# Patient Record
Sex: FEMALE | Race: WHITE | Employment: STUDENT | URBAN - METROPOLITAN AREA
[De-identification: names, ages, dates, MRNs, and addresses within clinical notes are randomized per-mention and may not be internally consistent; named-entity substitution may affect disease eponyms.]

---

## 2018-07-09 LAB
GRBS, EXTERNAL: NORMAL
HBSAG, EXTERNAL: NEGATIVE
HEPATITIS C AB,   EXT: NORMAL
HIV, EXTERNAL: NORMAL
RPR, EXTERNAL: NORMAL
RUBELLA, EXTERNAL: NORMAL

## 2018-12-13 ENCOUNTER — HOSPITAL ENCOUNTER (OUTPATIENT)
Dept: LAB | Age: 23
Discharge: HOME | End: 2018-12-13
Attending: OBSTETRICS & GYNECOLOGY
Payer: COMMERCIAL

## 2018-12-13 LAB — FIBRONECTIN FETAL VAG QL: NEGATIVE

## 2018-12-13 PROCEDURE — 82731 ASSAY OF FETAL FIBRONECTIN: CPT

## 2019-02-11 ENCOUNTER — ANESTHESIA EVENT (OUTPATIENT)
Dept: LABOR AND DELIVERY | Age: 24
End: 2019-02-11
Payer: COMMERCIAL

## 2019-02-11 ENCOUNTER — HOSPITAL ENCOUNTER (INPATIENT)
Age: 24
LOS: 2 days | Discharge: HOME | End: 2019-02-13
Attending: OBSTETRICS & GYNECOLOGY | Admitting: OBSTETRICS & GYNECOLOGY
Payer: COMMERCIAL

## 2019-02-11 ENCOUNTER — ANESTHESIA (OUTPATIENT)
Dept: LABOR AND DELIVERY | Age: 24
End: 2019-02-11
Payer: COMMERCIAL

## 2019-02-11 PROBLEM — O26.86 PUPP (PRURITIC URTICARIAL PAPULES AND PLAQUES OF PREGNANCY): Status: ACTIVE | Noted: 2019-02-11

## 2019-02-11 PROBLEM — Z34.90 ENCOUNTER FOR PLANNED INDUCTION OF LABOR: Status: ACTIVE | Noted: 2019-02-11

## 2019-02-11 PROBLEM — Z3A.39 39 WEEKS GESTATION OF PREGNANCY: Status: ACTIVE | Noted: 2019-02-11

## 2019-02-11 LAB
ABO + RH BLD: NORMAL
ARTERIAL PATENCY WRIST A: ABNORMAL
ARTERIAL PATENCY WRIST A: ABNORMAL
BASE DEFICIT BLD-SCNC: 4 MMOL/L
BASE DEFICIT BLD-SCNC: 6 MMOL/L
BDY SITE: ABNORMAL
BDY SITE: ABNORMAL
BLOOD GROUP ANTIBODIES SERPL: NORMAL
BODY TEMPERATURE: 98.6
BODY TEMPERATURE: 98.6
CO2 BLD-SCNC: 23 MMOL/L
CO2 BLD-SCNC: 24 MMOL/L
COLLECT TIME,HTIME: 143
COLLECT TIME,HTIME: 1430
ERYTHROCYTE [DISTWIDTH] IN BLOOD BY AUTOMATED COUNT: 13.4 % (ref 11.9–14.6)
GAS FLOW.O2 O2 DELIVERY SYS: ABNORMAL L/MIN
GAS FLOW.O2 O2 DELIVERY SYS: ABNORMAL L/MIN
HCO3 BLD-SCNC: 22.2 MMOL/L (ref 22–26)
HCO3 BLDV-SCNC: 21.4 MMOL/L (ref 23–28)
HCT VFR BLD AUTO: 34.1 % (ref 35.8–46.3)
HGB BLD-MCNC: 11 G/DL (ref 11.7–15.4)
MCH RBC QN AUTO: 27.1 PG (ref 26.1–32.9)
MCHC RBC AUTO-ENTMCNC: 32.3 G/DL (ref 31.4–35)
MCV RBC AUTO: 84 FL (ref 79.6–97.8)
NRBC # BLD: 0 K/UL (ref 0–0.2)
PCO2 BLDCO: 41 MMHG (ref 32–68)
PCO2 BLDCO: 52 MMHG (ref 32–68)
PH BLDCO: 7.24 [PH] (ref 7.15–7.38)
PH BLDCO: 7.33 [PH] (ref 7.15–7.38)
PLATELET # BLD AUTO: 242 K/UL (ref 150–450)
PMV BLD AUTO: 11 FL (ref 9.4–12.3)
PO2 BLDCO: 23 MMHG
PO2 BLDCO: 27 MMHG
RBC # BLD AUTO: 4.06 M/UL (ref 4.05–5.2)
SAO2 % BLD: 31 % (ref 95–98)
SAO2 % BLDV: 46 % (ref 65–88)
SERVICE CMNT-IMP: ABNORMAL
SERVICE CMNT-IMP: ABNORMAL
SPECIMEN EXP DATE BLD: NORMAL
SPECIMEN TYPE: ABNORMAL
SPECIMEN TYPE: ABNORMAL
WBC # BLD AUTO: 9.2 K/UL (ref 4.3–11.1)

## 2019-02-11 PROCEDURE — 77030003028 HC SUT VCRL J&J -A

## 2019-02-11 PROCEDURE — 75410000003 HC RECOV DEL/VAG/CSECN EA 0.5 HR

## 2019-02-11 PROCEDURE — 77030011943

## 2019-02-11 PROCEDURE — 4A1HXCZ MONITORING OF PRODUCTS OF CONCEPTION, CARDIAC RATE, EXTERNAL APPROACH: ICD-10-PCS | Performed by: OBSTETRICS & GYNECOLOGY

## 2019-02-11 PROCEDURE — A4300 CATH IMPL VASC ACCESS PORTAL: HCPCS | Performed by: ANESTHESIOLOGY

## 2019-02-11 PROCEDURE — 74011250636 HC RX REV CODE- 250/636: Performed by: OBSTETRICS & GYNECOLOGY

## 2019-02-11 PROCEDURE — 85027 COMPLETE CBC AUTOMATED: CPT

## 2019-02-11 PROCEDURE — 82803 BLOOD GASES ANY COMBINATION: CPT

## 2019-02-11 PROCEDURE — 74011250637 HC RX REV CODE- 250/637: Performed by: OBSTETRICS & GYNECOLOGY

## 2019-02-11 PROCEDURE — 75410000002 HC LABOR FEE PER 1 HR

## 2019-02-11 PROCEDURE — 74011250636 HC RX REV CODE- 250/636

## 2019-02-11 PROCEDURE — 74011000250 HC RX REV CODE- 250

## 2019-02-11 PROCEDURE — 76060000078 HC EPIDURAL ANESTHESIA

## 2019-02-11 PROCEDURE — 77030005518 HC CATH URETH FOL 2W BARD -B

## 2019-02-11 PROCEDURE — 77030018846 HC SOL IRR STRL H20 ICUM -A

## 2019-02-11 PROCEDURE — 65270000029 HC RM PRIVATE

## 2019-02-11 PROCEDURE — 74011000258 HC RX REV CODE- 258: Performed by: OBSTETRICS & GYNECOLOGY

## 2019-02-11 PROCEDURE — 77030014125 HC TY EPDRL BBMI -B: Performed by: ANESTHESIOLOGY

## 2019-02-11 PROCEDURE — 10907ZC DRAINAGE OF AMNIOTIC FLUID, THERAPEUTIC FROM PRODUCTS OF CONCEPTION, VIA NATURAL OR ARTIFICIAL OPENING: ICD-10-PCS | Performed by: OBSTETRICS & GYNECOLOGY

## 2019-02-11 PROCEDURE — 0UQMXZZ REPAIR VULVA, EXTERNAL APPROACH: ICD-10-PCS | Performed by: OBSTETRICS & GYNECOLOGY

## 2019-02-11 PROCEDURE — 75410000000 HC DELIVERY VAGINAL/SINGLE

## 2019-02-11 PROCEDURE — 3E033VJ INTRODUCTION OF OTHER HORMONE INTO PERIPHERAL VEIN, PERCUTANEOUS APPROACH: ICD-10-PCS | Performed by: OBSTETRICS & GYNECOLOGY

## 2019-02-11 PROCEDURE — 86900 BLOOD TYPING SEROLOGIC ABO: CPT

## 2019-02-11 RX ORDER — OXYTOCIN/RINGER'S LACTATE 30/500 ML
0-25 PLASTIC BAG, INJECTION (ML) INTRAVENOUS
Status: DISCONTINUED | OUTPATIENT
Start: 2019-02-11 | End: 2019-02-11 | Stop reason: HOSPADM

## 2019-02-11 RX ORDER — ROPIVACAINE HYDROCHLORIDE 2 MG/ML
INJECTION, SOLUTION EPIDURAL; INFILTRATION; PERINEURAL AS NEEDED
Status: DISCONTINUED | OUTPATIENT
Start: 2019-02-11 | End: 2019-02-12 | Stop reason: HOSPADM

## 2019-02-11 RX ORDER — LIDOCAINE HYDROCHLORIDE AND EPINEPHRINE 15; 5 MG/ML; UG/ML
INJECTION, SOLUTION EPIDURAL AS NEEDED
Status: DISCONTINUED | OUTPATIENT
Start: 2019-02-11 | End: 2019-02-12 | Stop reason: HOSPADM

## 2019-02-11 RX ORDER — ONDANSETRON 4 MG/1
4 TABLET, ORALLY DISINTEGRATING ORAL
Status: ACTIVE | OUTPATIENT
Start: 2019-02-11 | End: 2019-02-12

## 2019-02-11 RX ORDER — OXYTOCIN/RINGER'S LACTATE 30/500 ML
1-25 PLASTIC BAG, INJECTION (ML) INTRAVENOUS
Status: DISCONTINUED | OUTPATIENT
Start: 2019-02-11 | End: 2019-02-11

## 2019-02-11 RX ORDER — OXYTOCIN/RINGER'S LACTATE 30/500 ML
PLASTIC BAG, INJECTION (ML) INTRAVENOUS
Status: COMPLETED
Start: 2019-02-11 | End: 2019-02-11

## 2019-02-11 RX ORDER — FAMOTIDINE 20 MG/1
20 TABLET, FILM COATED ORAL
Status: COMPLETED | OUTPATIENT
Start: 2019-02-11 | End: 2019-02-11

## 2019-02-11 RX ORDER — SODIUM CHLORIDE 0.9 % (FLUSH) 0.9 %
5-40 SYRINGE (ML) INJECTION EVERY 8 HOURS
Status: DISCONTINUED | OUTPATIENT
Start: 2019-02-11 | End: 2019-02-11

## 2019-02-11 RX ORDER — DIPHENHYDRAMINE HCL 25 MG
25 CAPSULE ORAL
Status: DISCONTINUED | OUTPATIENT
Start: 2019-02-11 | End: 2019-02-13 | Stop reason: HOSPADM

## 2019-02-11 RX ORDER — LIDOCAINE HYDROCHLORIDE 10 MG/ML
1 INJECTION INFILTRATION; PERINEURAL
Status: DISCONTINUED | OUTPATIENT
Start: 2019-02-11 | End: 2019-02-11 | Stop reason: HOSPADM

## 2019-02-11 RX ORDER — BUTORPHANOL TARTRATE 2 MG/ML
1 INJECTION INTRAMUSCULAR; INTRAVENOUS
Status: DISCONTINUED | OUTPATIENT
Start: 2019-02-11 | End: 2019-02-11 | Stop reason: HOSPADM

## 2019-02-11 RX ORDER — OXYTOCIN/RINGER'S LACTATE 15/250 ML
250 PLASTIC BAG, INJECTION (ML) INTRAVENOUS ONCE
Status: DISCONTINUED | OUTPATIENT
Start: 2019-02-11 | End: 2019-02-11

## 2019-02-11 RX ORDER — HYDROCODONE BITARTRATE AND ACETAMINOPHEN 5; 325 MG/1; MG/1
1 TABLET ORAL
Status: DISCONTINUED | OUTPATIENT
Start: 2019-02-11 | End: 2019-02-13 | Stop reason: HOSPADM

## 2019-02-11 RX ORDER — DEXTROSE, SODIUM CHLORIDE, SODIUM LACTATE, POTASSIUM CHLORIDE, AND CALCIUM CHLORIDE 5; .6; .31; .03; .02 G/100ML; G/100ML; G/100ML; G/100ML; G/100ML
125 INJECTION, SOLUTION INTRAVENOUS CONTINUOUS
Status: DISCONTINUED | OUTPATIENT
Start: 2019-02-11 | End: 2019-02-11

## 2019-02-11 RX ORDER — IBUPROFEN 800 MG/1
800 TABLET ORAL
Status: DISCONTINUED | OUTPATIENT
Start: 2019-02-11 | End: 2019-02-13 | Stop reason: HOSPADM

## 2019-02-11 RX ORDER — SIMETHICONE 80 MG
80 TABLET,CHEWABLE ORAL
Status: DISCONTINUED | OUTPATIENT
Start: 2019-02-11 | End: 2019-02-13 | Stop reason: HOSPADM

## 2019-02-11 RX ORDER — HYDROMORPHONE HYDROCHLORIDE 2 MG/ML
1 INJECTION, SOLUTION INTRAMUSCULAR; INTRAVENOUS; SUBCUTANEOUS
Status: DISCONTINUED | OUTPATIENT
Start: 2019-02-11 | End: 2019-02-13 | Stop reason: HOSPADM

## 2019-02-11 RX ORDER — SODIUM CHLORIDE 0.9 % (FLUSH) 0.9 %
5-40 SYRINGE (ML) INJECTION AS NEEDED
Status: DISCONTINUED | OUTPATIENT
Start: 2019-02-11 | End: 2019-02-11

## 2019-02-11 RX ORDER — NALOXONE HYDROCHLORIDE 0.4 MG/ML
0.4 INJECTION, SOLUTION INTRAMUSCULAR; INTRAVENOUS; SUBCUTANEOUS AS NEEDED
Status: DISCONTINUED | OUTPATIENT
Start: 2019-02-11 | End: 2019-02-13 | Stop reason: HOSPADM

## 2019-02-11 RX ORDER — LIDOCAINE HYDROCHLORIDE 20 MG/ML
JELLY TOPICAL
Status: DISCONTINUED | OUTPATIENT
Start: 2019-02-11 | End: 2019-02-11 | Stop reason: HOSPADM

## 2019-02-11 RX ORDER — ROPIVACAINE HYDROCHLORIDE 2 MG/ML
INJECTION, SOLUTION EPIDURAL; INFILTRATION; PERINEURAL
Status: DISCONTINUED | OUTPATIENT
Start: 2019-02-11 | End: 2019-02-12 | Stop reason: HOSPADM

## 2019-02-11 RX ORDER — MINERAL OIL
120 OIL (ML) ORAL
Status: COMPLETED | OUTPATIENT
Start: 2019-02-11 | End: 2019-02-11

## 2019-02-11 RX ADMIN — MINERAL OIL 120 ML: 471.95 OIL ORAL at 13:50

## 2019-02-11 RX ADMIN — Medication 1 SPRAY: at 19:21

## 2019-02-11 RX ADMIN — WITCH HAZEL 1 PAD: 500 SOLUTION RECTAL; TOPICAL at 19:21

## 2019-02-11 RX ADMIN — ROPIVACAINE HYDROCHLORIDE 10 ML: 2 INJECTION, SOLUTION EPIDURAL; INFILTRATION; PERINEURAL at 10:38

## 2019-02-11 RX ADMIN — SODIUM CHLORIDE, SODIUM LACTATE, POTASSIUM CHLORIDE, CALCIUM CHLORIDE, AND DEXTROSE MONOHYDRATE 125 ML/HR: 600; 310; 30; 20; 5 INJECTION, SOLUTION INTRAVENOUS at 07:25

## 2019-02-11 RX ADMIN — Medication 2 MILLI-UNITS/MIN: at 07:39

## 2019-02-11 RX ADMIN — PENICILLIN G POTASSIUM 2.5 MILLION UNITS: 20000000 POWDER, FOR SOLUTION INTRAVENOUS at 11:40

## 2019-02-11 RX ADMIN — HYDROCODONE BITARTRATE AND ACETAMINOPHEN 1 TABLET: 5; 325 TABLET ORAL at 22:04

## 2019-02-11 RX ADMIN — SODIUM CHLORIDE 5 MILLION UNITS: 900 INJECTION, SOLUTION INTRAVENOUS at 07:43

## 2019-02-11 RX ADMIN — ROPIVACAINE HYDROCHLORIDE 10 ML/HR: 2 INJECTION, SOLUTION EPIDURAL; INFILTRATION; PERINEURAL at 10:38

## 2019-02-11 RX ADMIN — IBUPROFEN 800 MG: 800 TABLET, FILM COATED ORAL at 19:43

## 2019-02-11 RX ADMIN — LIDOCAINE HYDROCHLORIDE AND EPINEPHRINE 5 ML: 15; 5 INJECTION, SOLUTION EPIDURAL at 10:35

## 2019-02-11 RX ADMIN — FAMOTIDINE 20 MG: 20 TABLET ORAL at 09:51

## 2019-02-11 NOTE — H&P
History & Physical 
 
Name: Brooke Singleton MRN: [id number]  SSN: [id number] YOB: 1995  Age: 21 y.o. Sex: female Subjective:  
 
Estimated Date of Delivery: 19 OB History  Para Term  AB Living 1 SAB TAB Ectopic Molar Multiple Live Births # Outcome Date GA Lbr Sundeep/2nd Weight Sex Delivery Anes PTL Lv  
1 Current Ms. Lady Conner is admitted with pregnancy at 39w0d for induction of labor due to favorable cervix at term, maternal discomfort due to PUPPS- symptoms have resolved. Prenatal course was complicated by GBS in urine. Please see prenatal records for details. Past Medical History:  
Diagnosis Date  Primary oligomenorrhea 2013  PUPP (pruritic urticarial papules and plaques of pregnancy)  Past Surgical History:  
Procedure Laterality Date  HX OTHER SURGICAL    
 frenulum release  HX OTHER SURGICAL    
 arthroscopic knee Social History Occupational History  Not on file Tobacco Use  Smoking status: Never Smoker  Smokeless tobacco: Never Used Substance and Sexual Activity  Alcohol use: No  
  Frequency: Never  Drug use: No  
 Sexual activity: Yes  
  Partners: Male History reviewed. No pertinent family history. No Known Allergies Prior to Admission medications Medication Sig Start Date End Date Taking? Authorizing Provider  
prenatal vit-iron fumarate-fa 27 mg iron- 0.8 mg tab tablet Take 1 Tab by mouth daily. Yes Provider, Historical  
progesterone (PROMETRIUM) 100 mg capsule 1 po q hs prn if no period for 8-12 weeks 13   Makayla Ibarra MD  
  
 
Review of Systems: A comprehensive review of systems was negative except for that written in the History of Present Illness. Objective:  
 
Vitals: 
Vitals:  
 19 0730 19 0745 19 0751 19 0800 BP: (!) 132/94 126/90  119/85 Pulse: 96 94  93 Resp: 20     
 Temp: 98.4 °F (36.9 °C) Weight:   69.9 kg (154 lb) Height:   5' 5\" (1.651 m) Physical Exam: 
Patient without distress. Heart: Regular rate and rhythm, S1S2 present or without murmur or extra heart sounds Lung: clear to auscultation throughout lung fields, no wheezes, no rales, no rhonchi and normal respiratory effort Abdomen: soft, nontender Fundus: soft and non tender Perineum: blood absent, amniotic fluid absent Cervical Exam: 3.5/80/-1, blood with exam, AROM clear fluid Lower Extremities:  - Edema No 
Membranes:  Artificial Rupture of Membranes; Amniotic Fluid: medium amount of clear fluid Fetal Heart Rate: Reactive Baseline: 130 per minute Variability: moderate Accelerations: yes Decelerations: none Uterine contractions: irregular, every 3-5 minutes Prenatal Labs:  
Lab Results Component Value Date/Time  
 Rubella, External Immune 07/09/2018 HBsAg, External Negative 07/09/2018 HIV, External Non-Reactive 07/09/2018 RPR, External Non-Reactive 07/09/2018 GrBStrep, External Positive urine 07/09/2018 Impression/Plan: Active Problems: PUPP (pruritic urticarial papules and plaques of pregnancy) (2/11/2019) 39 weeks gestation of pregnancy (2/11/2019) Encounter for planned induction of labor (2/11/2019) Plan: Admit for induction of labor. Group B Strep positive, will treat prophylactically with penicillin. S/p AROM. On pitocin 4 mU. Continue to titrate as needed. Signed By:  Ayla Santiago MD   
 February 11, 2019

## 2019-02-11 NOTE — PROGRESS NOTES
Labor Note S: comfortable with ISAI 
 
O:  
Visit Vitals /70 Pulse (!) 109 Temp 98.3 °F (36.8 °C) Resp 20 Ht 5' 5\" (1.651 m) Wt 69.9 kg (154 lb) Breastfeeding? No  
BMI 25.63 kg/m² Gen- NAD 
SVE- 5-6/90/0 FHT - baseline 130, mod variability, + accels , no decels [place]- ctx q 2-3 A/P: 21 y.o. G1 @ 44 0/7 
1) Term IUP- Cat 1 
2) Labor- s/p AROM. On pitocin. Comfortable with ISAI. 3) GBS pos- getting PCN.

## 2019-02-11 NOTE — ANESTHESIA PREPROCEDURE EVALUATION
Anesthetic History No history of anesthetic complications Review of Systems / Medical History Patient summary reviewed and pertinent labs reviewed Pulmonary Within defined limits Neuro/Psych Within defined limits Cardiovascular Within defined limits Exercise tolerance: >4 METS 
  
GI/Hepatic/Renal 
Within defined limits Endo/Other Within defined limits Other Findings Physical Exam 
 
Airway Mallampati: II 
TM Distance: 4 - 6 cm Neck ROM: normal range of motion Mouth opening: Normal 
 
 Cardiovascular Rhythm: regular Rate: normal 
 
 
 
 Dental 
No notable dental hx Pulmonary Breath sounds clear to auscultation Abdominal 
GI exam deferred Other Findings Anesthetic Plan ASA: 2 Anesthesia type: epidural 
 
 
 
 
Induction: Intravenous Anesthetic plan and risks discussed with: Patient

## 2019-02-11 NOTE — L&D DELIVERY NOTE
Delivery Summary    Patient: Cortney Agrawal MRN: [id number]  SSN: [id number]    YOB: 1995  Age: 21 y.o. Sex: female         Obstetrician:  Gita Posey MD    Assistant: none    Pre-Delivery Diagnosis: Term pregnancy, Induced labor, Single fetus and Pregnancy complicated by: GBS in urine, PUPPS    Post-Delivery Diagnosis: Living  infant(s) and Female    Intrapartum Event: None    Procedure: Spontaneous vaginal delivery    Epidural: YES    Information for the patient's :  Mallory Aguirre [[id number]]       Labor Events:    Labor: No   Rupture Date: 2019   Rupture Time: 8:21 AM   Rupture Type AROM   Amniotic Fluid Volume: Moderate    Amniotic Fluid Description: Clear None   Induction: AROM       Augmentation: Oxytocin   Labor Events: None     Cervical Ripening:     None     Delivery Events:  Episiotomy: None   Laceration(s): Vaginal;Left periurethral     Repaired: Yes    Number of Repair Packets: 2   Suture Type and Size: Vicryl 3-0     Estimated Blood Loss (ml): 200ml       Delivery Date: 2019    Delivery Time: 2:30 PM  Delivery Type: Vaginal, Spontaneous  Sex:  Female     Gestational Age: 39w0d   Delivery Clinician:  Gita Posey  Living Status: Living   Delivery Location: L&D 434          APGARS  One minute Five minutes Ten minutes   Skin color: 1   1        Heart rate: 2   2        Grimace: 2   2        Muscle tone: 2   2        Breathin   2        Totals: 9   9            Presentation: Vertex    Position: Right Occiput Anterior  Resuscitation Method:  Suctioning-bulb; Tactile Stimulation     Meconium Stained:        Cord Information: 3 Vessels  Complications: None  Cord around:    Delayed cord clamping?  Yes  Cord clamped date/time:   Disposition of Cord Blood: Lab    Blood Gases Sent?: Yes    Placenta:  Date/Time: 2019  2:36 PM  Removal: Spontaneous      Appearance: Normal     [place] Measurements:  Birth Weight: 3.29 kg      Birth Length: 48.5 cm Head Circumference: 35 cm      Chest Circumference: 34 cm     Abdominal Girth: Other Providers:   CHRIS Tolliver;Russell PRINCE, Obstetrician;Primary Nurse;Primary  Nurse;Scrub Tech;Charge Nurse            Recent Labs     19  1447 19  1446   PCO2CB 41 52   PO2CB 27 23   HCO3I  --  22.2   SO2I  --  31*   IBD 4 6   PTEMPI 98.6 98.6   SPECTI VENOUS CORD ARTERIAL CORD   PHICB 7.327 7.238   ISITE CORD CORD   IDEV ROOM AIR ROOM AIR   IALLEN NOT APPLICABLE NOT APPLICABLE         Cord Blood Results:   Information for the patient's :  Vaughn Funk [[id number]]     Lab Results   Component Value Date/Time    JOSE IgG NEG 2019 02:30 PM    ABO/Rh(D) [place] POSITIVE 2019 02:30 PM     Prenatal Labs:     Lab Results   Component Value Date/Time    ABO/Rh(D) [place] POSITIVE 2019 07:23 AM    HBsAg, External Negative 2018    HIV, External Non-Reactive 2018    Rubella, External Immune 2018    RPR, External Non-Reactive 2018    GrBStrep, External Positive urine 2018      Group B Strep:   Lab Results   Component Value Date/Time    GrBStrep, External Positive urine 2018     Information for the patient's :  Vaughn Funk [[id number]]     Lab Results   Component Value Date/Time    ABO/Rh(D) [place] Shake POSITIVE 2019 02:30 PM    JOSE IgG NEG 2019 02:30 PM            Attending Attestation:   Shandra Simeon delivered in [address] [place] [address] [facility]. Shoulders and body easily delivered. Mouth and nose bulb suctioned. Infant placed on maternal abdomen. Cord gases and blood obtained after 2 minute delay. Cord clamped x 2 and cut. Placenta delivered intact using fundal massage and gentle traction, 3V cord. Perineum inspected and intact, bilateral sulcal tears 3 cm in length that goes superficially over the labia. Repaired with 3-0 vicryl in the usual fashion.  Left periurethral laceration repaired in the usual fashion. EBL: 200 cc  Fundus firm. Mom and baby stable. I was present and scrubbed for the entire procedure.      Signed By:  Jes Cardenas MD     February 11, 2019

## 2019-02-11 NOTE — PROGRESS NOTES
Epidural removed, tip intact I/O cath 600 cc clear yellow urine returned Pad changed, anaya care provided

## 2019-02-11 NOTE — ANESTHESIA PROCEDURE NOTES
Epidural Block Start time: 2/11/2019 10:34 AM 
End time: 2/11/2019 10:38 AM 
Performed by: Keanu Benitez MD 
Authorized by: Keanu Benitez MD  
 
Pre-Procedure Indication: at surgeon's request, post-op pain management, procedure for pain and labor epidural   
Preanesthetic Checklist: patient identified, risks and benefits discussed, anesthesia consent, site marked, patient being monitored, timeout performed and anesthesia consent Timeout Time: 10:31 Epidural:  
Patient position:  Seated Prep region:  Lumbar Prep: Chlorhexidine Location:  L3-4 Needle and Epidural Catheter:  
Needle Type:  Tuohy Needle Gauge:  17 G Injection Technique:  Loss of resistance using saline Attempts:  1 Catheter Size:  19 G Catheter at Skin Depth (cm):  11 Depth in Epidural Space (cm):  6 Events: no blood with aspiration, no cerebrospinal fluid with aspiration, no paresthesia and negative aspiration test   
Test Dose:  Lidocaine 1.5% w/ epi and negative Assessment:  
Catheter Secured:  Tegaderm and tape Insertion:  Uncomplicated Patient tolerance:  Patient tolerated the procedure well with no immediate complications

## 2019-02-11 NOTE — PROGRESS NOTES
1428:  MD remains at bedside; pt set for vaginal delivery 1430:   viable female, apgars 9/9 wt  7-4 
1436:  Delivery of placenta; pitocin infusion started 1500:  Recovery started

## 2019-02-11 NOTE — PROGRESS NOTES
Dr. Heath at bedside; portion of strip reviewed SVE per MD 3/80/-1 AROM; clear fluid May have epidural when desires [place] once comfortable

## 2019-02-11 NOTE — PROGRESS NOTES
Pt admitted to 434 for scheduled IOL. Plan of care reviewed with pt and , both verbalizes understanding. Consents obtained. IV started on 2nd attempt, blood drawn and sent to lab.

## 2019-02-11 NOTE — ROUTINE PROCESS
SBAR OUT Report: Mother Verbal report given to Gaudencio Pritchett RN on this patient, who is now being transferred to MI (unit) for routine progression of care. The patient is not wearing a green \"Anesthesia-Duramorph\" band. Report consisted of patient's Situation, Background, Assessment and Recommendations (SBAR). [place] ID bands were compared with the identification form, and verified with the patient and receiving nurse. Information from the SBAR and the [facility] Report was reviewed with the receiving nurse; opportunity for questions and clarification provided.

## 2019-02-11 NOTE — ROUTINE PROCESS
SBAR IN Report: Mother Verbal report received from Marcel Walton RN on this patient, who is now being transferred from L&D for routine progression of care. The patient is not wearing a green \"Anesthesia-Duramorph\" band. Report consisted of patient's Situation, Background, Assessment and Recommendations (SBAR).  ID bands were compared with the identification form, and verified with the patient and transferring nurse. Information from the SBAR, Kardex, Procedure Summary, Intake/Output, MAR, Accordion and Recent Results and the [place] Report was reviewed with the transferring nurse; opportunity for questions and clarification provided.

## 2019-02-12 PROCEDURE — 65270000029 HC RM PRIVATE

## 2019-02-12 PROCEDURE — 74011250637 HC RX REV CODE- 250/637: Performed by: OBSTETRICS & GYNECOLOGY

## 2019-02-12 RX ADMIN — IBUPROFEN 800 MG: 800 TABLET, FILM COATED ORAL at 21:08

## 2019-02-12 RX ADMIN — HYDROCODONE BITARTRATE AND ACETAMINOPHEN 1 TABLET: 5; 325 TABLET ORAL at 02:00

## 2019-02-12 RX ADMIN — IBUPROFEN 800 MG: 800 TABLET, FILM COATED ORAL at 02:01

## 2019-02-12 RX ADMIN — IBUPROFEN 800 MG: 800 TABLET, FILM COATED ORAL at 08:38

## 2019-02-12 RX ADMIN — HYDROCODONE BITARTRATE AND ACETAMINOPHEN 1 TABLET: 5; 325 TABLET ORAL at 10:23

## 2019-02-12 RX ADMIN — WITCH HAZEL 1 PAD: 500 SOLUTION RECTAL; TOPICAL at 19:32

## 2019-02-12 RX ADMIN — IBUPROFEN 800 MG: 800 TABLET, FILM COATED ORAL at 15:04

## 2019-02-12 RX ADMIN — HYDROCODONE BITARTRATE AND ACETAMINOPHEN 1 TABLET: 5; 325 TABLET ORAL at 15:04

## 2019-02-12 RX ADMIN — HYDROCODONE BITARTRATE AND ACETAMINOPHEN 1 TABLET: 5; 325 TABLET ORAL at 06:07

## 2019-02-12 RX ADMIN — HYDROCODONE BITARTRATE AND ACETAMINOPHEN 1 TABLET: 5; 325 TABLET ORAL at 22:18

## 2019-02-12 NOTE — PROGRESS NOTES
Post-Partum Day Number 1 Progress Note Patient doing well post-partum without significant complaint. Voiding withour difficulty, normal lochia. Vitals:  No data found. Temp (24hrs), Av.4 °F (36.9 °C), Min:98 °F (36.7 °C), Max:98.9 °F (37.2 °C) Vital signs stable, afebrile. Exam:  Patient without distress. Abdomen soft, fundus firm at level of umbilicus, nontender Perineum with normal lochia noted. Lower extremities are negative for swelling, cords or tenderness. Lab/Data Review: CBC:  
Lab Results Component Value Date/Time WBC 9.2 2019 07:23 AM  
 HGB 11.0 (L) 2019 07:23 AM  
 HCT 34.1 (L) 2019 07:23 AM  
  2019 07:23 AM  
 
 
Assessment and Plan:  Patient appears to be having uncomplicated post-partum course. Continue routine perineal care and maternal education. Plan discharge tomorrow if no problems occur.

## 2019-02-12 NOTE — PROGRESS NOTES
Report received from [facility], RN. Assumed patient care. Bedside report completed.

## 2019-02-12 NOTE — ANESTHESIA POSTPROCEDURE EVALUATION
* No procedures listed *. Anesthesia Post Evaluation Multimodal analgesia: multimodal analgesia used between 6 hours prior to anesthesia start to PACU discharge Patient location during evaluation: bedside Patient participation: complete - patient participated Level of consciousness: responsive to verbal stimuli Pain management: adequate Airway patency: patent Anesthetic complications: no 
Cardiovascular status: hemodynamically stable Respiratory status: spontaneous ventilation Hydration status: stable Comments: The patient was satisfied with her labor epidural and denies any complications. Her lower extremities have returned to baseline neurologically. Visit Vitals /87 (BP 1 Location: Right arm, BP Patient Position: At rest) Pulse (!) 105 Temp 36.7 °C (98 °F) Resp 17 Ht 5' 5\" (1.651 m) Wt 69.9 kg (154 lb) SpO2 98% Breastfeeding? Unknown BMI 25.63 kg/m²

## 2019-02-13 VITALS
BODY MASS INDEX: 25.66 KG/M2 | OXYGEN SATURATION: 99 % | WEIGHT: 154 LBS | DIASTOLIC BLOOD PRESSURE: 77 MMHG | HEART RATE: 85 BPM | HEIGHT: 65 IN | TEMPERATURE: 98 F | RESPIRATION RATE: 16 BRPM | SYSTOLIC BLOOD PRESSURE: 119 MMHG

## 2019-02-13 PROCEDURE — 74011250637 HC RX REV CODE- 250/637: Performed by: OBSTETRICS & GYNECOLOGY

## 2019-02-13 RX ORDER — HYDROCODONE BITARTRATE AND ACETAMINOPHEN 5; 325 MG/1; MG/1
1-2 TABLET ORAL
Qty: 20 TAB | Refills: 0 | Status: ON HOLD | OUTPATIENT
Start: 2019-02-13 | End: 2020-08-27

## 2019-02-13 RX ADMIN — IBUPROFEN 800 MG: 800 TABLET, FILM COATED ORAL at 03:59

## 2019-02-13 RX ADMIN — HYDROCODONE BITARTRATE AND ACETAMINOPHEN 1 TABLET: 5; 325 TABLET ORAL at 09:15

## 2019-02-13 RX ADMIN — IBUPROFEN 800 MG: 800 TABLET, FILM COATED ORAL at 10:26

## 2019-02-13 RX ADMIN — HYDROCODONE BITARTRATE AND ACETAMINOPHEN 1 TABLET: 5; 325 TABLET ORAL at 03:59

## 2019-02-13 NOTE — PROGRESS NOTES
Report received from [facility], RN. Assumed patient care. Bedside report completed.

## 2019-02-13 NOTE — DISCHARGE SUMMARY
Obstetrical Discharge Summary     Name: Radha Osman MRN: [id number]  SSN: [id number]    YOB: 1995  Age: 21 y.o. Sex: female      Allergies: Patient has no known allergies. Admit Date: 2019    Discharge Date: 2019     Admitting Physician: Sloan Arciniega MD     Attending Physician:  Londell Dancer, MD     * Admission Diagnoses: PUPP (pruritic urticarial papules and plaques of pregnancy) [O26.86]  Encounter for planned induction of labor [Z34.90]  39 weeks gestation of pregnancy [Z3A.39]    * Discharge Diagnoses:   Information for the patient's :  Peña Narayan [[id number]]   Delivery of a 3.29 kg female infant via Vaginal, Spontaneous on 2019 at 2:30 PM  by . Apgars were 9 and 9. Additional Diagnoses:   Hospital Problems as of 2019 Date Reviewed: 2019          Codes Class Noted - Resolved POA    * (Principal) PUPP (pruritic urticarial papules and plaques of pregnancy) ICD-10-CM: O26.86  ICD-9-CM: 646.80, 709.8  2019 - Present Unknown        39 weeks gestation of pregnancy ICD-10-CM: Z3A.39  ICD-9-CM: V22.2  2019 - Present Unknown        Encounter for planned induction of labor ICD-10-CM: Z34.90  ICD-9-CM: V22.1  2019 - Present Unknown             Lab Results   Component Value Date/Time    ABO/Rh(D) O POSITIVE 2019 07:23 AM    Rubella, External Immune 2018    GrBStrep, External Positive urine 2018    There is no immunization history for the selected administration types on file for this patient.     * Procedures:   * No surgery found *      [place]  Depression Scale  I have been able to laugh and see the funny side of things: As much as I always could  I have looked forward with enjoyment to things: As much as I ever did  I have blamed myself unnecessarily when things went wrong: Not very often  I have been anxious or worried for no good reason: Yes, sometimes  I have felt scared or panicky for no very good reason: No, not at all  Things have been getting on top of me: No, I have been coping as well as ever  I have been so unhappy that I have had difficulty sleeping: No, not at all  I have felt sad or miserable: No, not at all  I have been so unhappy that I have been crying: No, never  The thought of harming myself has occurred to me: Never  Total Score: 3    * Discharge Condition: good and stable    * Hospital Course: Normal hospital course following the delivery. * Disposition: Home    Discharge Medications:   Current Discharge Medication List      START taking these medications    Details   HYDROcodone-acetaminophen (NORCO) 5-325 mg per tablet Take 1-2 Tabs by mouth every six (6) hours as needed. Max Daily Amount: 8 Tabs. Qty: 20 Tab, Refills: 0    Associated Diagnoses:  (spontaneous vaginal delivery)             * Follow-up Care/Patient Instructions: Activity: Activity as tolerated  Diet: Regular Diet  Wound Care: Keep wound clean and dry    Follow-up Information     Follow up With Specialties Details Why Contact Info    Unknown, Provider    Patient not available to ask             Signed By:  Guillaume Oneil MD     2019                                               Post-Partum Day Number 2 Progress/Discharge Note    Patient doing well post-partum without significant complaint. Voiding without difficulty, normal lochia, positive flatus. Vitals:    Patient Vitals for the past 8 hrs:   BP Temp Pulse Resp SpO2   19 0710 119/77 98 °F (36.7 °C) 85 16 99 %     Temp (24hrs), Av.2 °F (36.8 °C), Min:98 °F (36.7 °C), Max:98.3 °F (36.8 °C)      Vital signs stable, afebrile. Exam:  Patient without distress. Abdomen soft, fundus firm at level of umbilicus, non tender               Perineum with normal lochia noted. Lower extremities are negative for swelling, cords or tenderness. Lab/Data Review:   All lab results for the last 24 hours reviewed. Assessment and Plan:  Patient appears to be having uncomplicated post-partum course. Continue routine perineal care and maternal education. Plan discharge for today with follow up in our office in 6 weeks.

## 2019-02-13 NOTE — DISCHARGE INSTRUCTIONS
Patient Education        Vaginal Childbirth: Care Instructions  Your Care Instructions    Your body will slowly heal in the next few weeks. It is easy to get too tired and overwhelmed during the first weeks after your baby is born. Changes in your hormones can shift your mood without warning. You may find it hard to meet the extra demands on your energy and time. Take it easy on yourself. Follow-up care is a key part of your treatment and safety. Be sure to make and go to all appointments, and call your doctor if you are having problems. It's also a good idea to know your test results and keep a list of the medicines you take. How can you care for yourself at home? · Vaginal bleeding and cramps  ? After delivery, you will have a bloody discharge from the vagina. This will turn pink within a week and then white or yellow after about 10 days. It may last for 2 to 4 weeks or longer, until the uterus has healed. Use pads instead of tampons until you stop bleeding. ? Do not worry if you pass some blood clots, as long as they are smaller than a golf ball. If you have a tear or stitches in your vaginal area, change the pad at least every 4 hours to prevent soreness and infection. ? You may have cramps for the first few days after childbirth. These are normal and occur as the uterus shrinks to normal size. Take an over-the-counter pain medicine, such as acetaminophen (Tylenol), ibuprofen (Advil, Motrin), or naproxen (Aleve), for cramps. Read and follow all instructions on the label. Do not take aspirin, because it can cause more bleeding. ? Do not take two or more pain medicines at the same time unless the doctor told you to. Many pain medicines have acetaminophen, which is Tylenol. Too much acetaminophen (Tylenol) can be harmful. · Stitches  ? If you have stitches, they will dissolve on their own and do not need to be removed. Follow your doctor's instructions for cleaning the stitched area.   ? Put ice or a cold pack on your painful area for 10 to 20 minutes at a time, several times a day, for the first few days. Put a thin cloth between the ice and your skin. ? Sit in a few inches of warm water (sitz bath) 3 times a day and after bowel movements. The warm water helps with pain and itching. If you do not have a tub, a warm shower might help. · Breast fullness  ? Your breasts may overfill (engorge) in the first few days after delivery. To help milk flow and to relieve pain, warm your breasts in the shower or by using warm, moist towels before nursing. ? If you are not nursing, do not put warmth on your breasts or touch your breasts. Wear a tight bra or sports bra and use ice until the fullness goes away. This usually takes 2 to 3 days. ? Put ice or a cold pack on your breast after nursing to reduce swelling and pain. Put a thin cloth between the ice and your skin. · Activity  ? Eat a balanced diet. Do not try to lose weight by cutting calories. Keep taking your prenatal vitamins, or take a multivitamin. ? Get as much rest as you can. Try to take naps when your baby sleeps during the day. ? Get some exercise every day. But do not do any heavy exercise until your doctor says it is okay. ? Wait until you are healed (about 4 to 6 weeks) before you have sexual intercourse. Your doctor will tell you when it is okay to have sex. ? Talk to your doctor about birth control. You can get pregnant even before your period returns. Also, you can get pregnant while you are breastfeeding. · Mental health  ? It is normal to have some sadness, anxiety, sleeplessness, and mood swings after you go home. If you feel upset or hopeless for more than a few days or are having trouble doing the things you need to do, talk to your doctor. · Constipation and hemorrhoids  ? Drink plenty of fluids, enough so that your urine is light yellow or clear like water.  If you have kidney, heart, or liver disease and have to limit fluids, talk with your doctor before you increase the amount of fluids you drink. ? Eat plenty of fiber each day. Have a bran muffin or bran cereal for breakfast, and try eating a piece of fruit for a mid-afternoon snack. ? For painful, itchy hemorrhoids, put ice or a cold pack on the area several times a day for 10 minutes at a time. Follow this by putting a warm compress on the area for another 10 to 20 minutes or by sitting in a shallow, warm bath. When should you call for help? Call 911 anytime you think you may need emergency care. For example, call if:    · You passed out (lost consciousness).    Call your doctor now or seek immediate medical care if:    · You have severe vaginal bleeding.     · You are dizzy or lightheaded, or you feel like you may faint.     · You have a fever.     · You have new or more pain in your belly or pelvis.    Watch closely for changes in your health, and be sure to contact your doctor if:    · Your vaginal bleeding seems to be getting heavier.     · You have new or worse vaginal discharge.     · You feel sad, anxious, or hopeless for more than a few days.     · You do not get better as expected. Where can you learn more? Go to http://kathy-evelyn.info/. Enter S084 in the search box to learn more about \"Vaginal Childbirth: Care Instructions. \"  Current as of: September 5, 2018  Content Version: 11.9  © 6094-0476 [employer] Care instructions adapted under license by [employer] (which disclaims liability or warranty for this information). If you have questions about a medical condition or this instruction, always ask your healthcare professional. [place] [address] any warranty or liability for your use of this information.

## 2019-02-13 NOTE — PROGRESS NOTES
Chart reviewed due to first time parent - no needs identified.  met with family and provided education on [facility] Postpartum [place] Home Visit Program.  Family declined referral for home visit. No PCP - list of PCPs provided.  provided informational packet on  mood disorder education/resources. Family receptive to receiving information and denied any additional needs from . Family has this 's contact information should any needs/questions arise. Jordin Rick, [address] [facility]

## 2020-08-26 ENCOUNTER — HOSPITAL ENCOUNTER (OUTPATIENT)
Age: 25
Setting detail: OBSERVATION
Discharge: HOME | End: 2020-08-29
Attending: OBSTETRICS & GYNECOLOGY | Admitting: OBSTETRICS & GYNECOLOGY
Payer: COMMERCIAL

## 2020-08-26 PROBLEM — R19.7 DIARRHEA: Status: ACTIVE | Noted: 2020-08-26

## 2020-08-26 PROBLEM — Z3A.19 19 WEEKS GESTATION OF PREGNANCY: Status: ACTIVE | Noted: 2020-08-26

## 2020-08-26 PROBLEM — K62.5 RECTAL BLEEDING: Status: ACTIVE | Noted: 2020-08-26

## 2020-08-26 PROBLEM — E86.0 ANTEPARTUM DEHYDRATION: Status: ACTIVE | Noted: 2020-08-26

## 2020-08-26 PROBLEM — R10.2 PELVIC PAIN IN ANTEPARTUM PERIOD IN SECOND TRIMESTER: Status: ACTIVE | Noted: 2020-08-26

## 2020-08-26 PROBLEM — O26.892 PELVIC PAIN IN ANTEPARTUM PERIOD IN SECOND TRIMESTER: Status: ACTIVE | Noted: 2020-08-26

## 2020-08-26 PROBLEM — O26.899 ANTEPARTUM DEHYDRATION: Status: ACTIVE | Noted: 2020-08-26

## 2020-08-26 PROBLEM — K52.9 GASTROENTERITIS: Status: ACTIVE | Noted: 2020-08-26

## 2020-08-26 LAB
ALBUMIN SERPL-MCNC: 2.3 G/DL (ref 3.5–5)
ALBUMIN SERPL-MCNC: 2.7 G/DL (ref 3.5–5)
ALBUMIN/GLOB SERPL: 0.6 {RATIO} (ref 1.2–3.5)
ALBUMIN/GLOB SERPL: 0.6 {RATIO} (ref 1.2–3.5)
ALP SERPL-CCNC: 101 U/L (ref 50–136)
ALP SERPL-CCNC: 119 U/L (ref 50–136)
ALT SERPL-CCNC: 54 U/L (ref 12–65)
ALT SERPL-CCNC: 74 U/L (ref 12–65)
ANION GAP SERPL CALC-SCNC: 12 MMOL/L (ref 7–16)
ANION GAP SERPL CALC-SCNC: 6 MMOL/L (ref 7–16)
AST SERPL-CCNC: 18 U/L (ref 15–37)
AST SERPL-CCNC: 29 U/L (ref 15–37)
BASOPHILS # BLD: 0 K/UL (ref 0–0.2)
BASOPHILS # BLD: 0 K/UL (ref 0–0.2)
BASOPHILS NFR BLD: 0 % (ref 0–2)
BASOPHILS NFR BLD: 0 % (ref 0–2)
BILIRUB SERPL-MCNC: 0.3 MG/DL (ref 0.2–1.1)
BILIRUB SERPL-MCNC: 0.4 MG/DL (ref 0.2–1.1)
BUN SERPL-MCNC: 3 MG/DL (ref 6–23)
BUN SERPL-MCNC: 6 MG/DL (ref 6–23)
C DIFF GDH STL QL: NORMAL
C DIFF TOX A+B STL QL IA: NORMAL
CALCIUM SERPL-MCNC: 8 MG/DL (ref 8.3–10.4)
CALCIUM SERPL-MCNC: 8.4 MG/DL (ref 8.3–10.4)
CHLORIDE SERPL-SCNC: 103 MMOL/L (ref 98–107)
CHLORIDE SERPL-SCNC: 106 MMOL/L (ref 98–107)
CLINICAL CONSIDERATION: NORMAL
CO2 SERPL-SCNC: 20 MMOL/L (ref 21–32)
CO2 SERPL-SCNC: 26 MMOL/L (ref 21–32)
CREAT SERPL-MCNC: 0.38 MG/DL (ref 0.6–1)
CREAT SERPL-MCNC: 0.48 MG/DL (ref 0.6–1)
CRP SERPL-MCNC: 6.8 MG/DL (ref 0–0.9)
DIFFERENTIAL METHOD BLD: ABNORMAL
DIFFERENTIAL METHOD BLD: ABNORMAL
EOSINOPHIL # BLD: 0 K/UL (ref 0–0.8)
EOSINOPHIL # BLD: 0 K/UL (ref 0–0.8)
EOSINOPHIL NFR BLD: 0 % (ref 0.5–7.8)
EOSINOPHIL NFR BLD: 0 % (ref 0.5–7.8)
ERYTHROCYTE [DISTWIDTH] IN BLOOD BY AUTOMATED COUNT: 13.3 % (ref 11.9–14.6)
ERYTHROCYTE [DISTWIDTH] IN BLOOD BY AUTOMATED COUNT: 13.6 % (ref 11.9–14.6)
GLOBULIN SER CALC-MCNC: 3.7 G/DL (ref 2.3–3.5)
GLOBULIN SER CALC-MCNC: 4.4 G/DL (ref 2.3–3.5)
GLUCOSE SERPL-MCNC: 109 MG/DL (ref 65–100)
GLUCOSE SERPL-MCNC: 117 MG/DL (ref 65–100)
HCT VFR BLD AUTO: 35.4 % (ref 35.8–46.3)
HCT VFR BLD AUTO: 38.5 % (ref 35.8–46.3)
HGB BLD-MCNC: 11.8 G/DL (ref 11.7–15.4)
HGB BLD-MCNC: 13.4 G/DL (ref 11.7–15.4)
IMM GRANULOCYTES # BLD AUTO: 0.1 K/UL (ref 0–0.5)
IMM GRANULOCYTES # BLD AUTO: 0.1 K/UL (ref 0–0.5)
IMM GRANULOCYTES NFR BLD AUTO: 1 % (ref 0–5)
IMM GRANULOCYTES NFR BLD AUTO: 1 % (ref 0–5)
INTERPRETATION: NORMAL
LIPASE SERPL-CCNC: 73 U/L (ref 73–393)
LYMPHOCYTES # BLD: 1 K/UL (ref 0.5–4.6)
LYMPHOCYTES # BLD: 1 K/UL (ref 0.5–4.6)
LYMPHOCYTES NFR BLD: 9 % (ref 13–44)
LYMPHOCYTES NFR BLD: 9 % (ref 13–44)
MCH RBC QN AUTO: 29.4 PG (ref 26.1–32.9)
MCH RBC QN AUTO: 30.2 PG (ref 26.1–32.9)
MCHC RBC AUTO-ENTMCNC: 33.3 G/DL (ref 31.4–35)
MCHC RBC AUTO-ENTMCNC: 34.8 G/DL (ref 31.4–35)
MCV RBC AUTO: 86.9 FL (ref 79.6–97.8)
MCV RBC AUTO: 88.3 FL (ref 79.6–97.8)
MONOCYTES # BLD: 0.5 K/UL (ref 0.1–1.3)
MONOCYTES # BLD: 0.8 K/UL (ref 0.1–1.3)
MONOCYTES NFR BLD: 5 % (ref 4–12)
MONOCYTES NFR BLD: 8 % (ref 4–12)
NEUTS SEG # BLD: 8.7 K/UL (ref 1.7–8.2)
NEUTS SEG # BLD: 8.8 K/UL (ref 1.7–8.2)
NEUTS SEG NFR BLD: 82 % (ref 43–78)
NEUTS SEG NFR BLD: 85 % (ref 43–78)
NRBC # BLD: 0 K/UL (ref 0–0.2)
NRBC # BLD: 0 K/UL (ref 0–0.2)
PCR REFLEX: NORMAL
PLATELET # BLD AUTO: 230 K/UL (ref 150–450)
PLATELET # BLD AUTO: 243 K/UL (ref 150–450)
PMV BLD AUTO: 10 FL (ref 9.4–12.3)
PMV BLD AUTO: 9.9 FL (ref 9.4–12.3)
POTASSIUM SERPL-SCNC: 3.3 MMOL/L (ref 3.5–5.1)
POTASSIUM SERPL-SCNC: 3.6 MMOL/L (ref 3.5–5.1)
PROT SERPL-MCNC: 6 G/DL (ref 6.3–8.2)
PROT SERPL-MCNC: 7.1 G/DL (ref 6.3–8.2)
RBC # BLD AUTO: 4.01 M/UL (ref 4.05–5.2)
RBC # BLD AUTO: 4.43 M/UL (ref 4.05–5.2)
SODIUM SERPL-SCNC: 135 MMOL/L (ref 136–145)
SODIUM SERPL-SCNC: 138 MMOL/L (ref 136–145)
WBC # BLD AUTO: 10.4 K/UL (ref 4.3–11.1)
WBC # BLD AUTO: 10.6 K/UL (ref 4.3–11.1)

## 2020-08-26 PROCEDURE — 87427 SHIGA-LIKE TOXIN AG IA: CPT

## 2020-08-26 PROCEDURE — 87045 FECES CULTURE AEROBIC BACT: CPT

## 2020-08-26 PROCEDURE — 75810000275 HC EMERGENCY DEPT VISIT NO LEVEL OF CARE

## 2020-08-26 PROCEDURE — 83993 ASSAY FOR CALPROTECTIN FECAL: CPT

## 2020-08-26 PROCEDURE — 96360 HYDRATION IV INFUSION INIT: CPT

## 2020-08-26 PROCEDURE — 74011250636 HC RX REV CODE- 250/636: Performed by: OBSTETRICS & GYNECOLOGY

## 2020-08-26 PROCEDURE — 0107U C DIFF TOX AG DETCJ IA STOOL: CPT

## 2020-08-26 PROCEDURE — 87635 SARS-COV-2 COVID-19 AMP PRB: CPT

## 2020-08-26 PROCEDURE — 86140 C-REACTIVE PROTEIN: CPT

## 2020-08-26 PROCEDURE — 99218 HC RM OBSERVATION: CPT

## 2020-08-26 PROCEDURE — 74011250637 HC RX REV CODE- 250/637: Performed by: OBSTETRICS & GYNECOLOGY

## 2020-08-26 PROCEDURE — 36415 COLL VENOUS BLD VENIPUNCTURE: CPT

## 2020-08-26 PROCEDURE — 85025 COMPLETE CBC W/AUTO DIFF WBC: CPT

## 2020-08-26 PROCEDURE — 87177 OVA AND PARASITES SMEARS: CPT

## 2020-08-26 PROCEDURE — 96376 TX/PRO/DX INJ SAME DRUG ADON: CPT

## 2020-08-26 PROCEDURE — 96361 HYDRATE IV INFUSION ADD-ON: CPT

## 2020-08-26 PROCEDURE — 87046 STOOL CULTR AEROBIC BACT EA: CPT

## 2020-08-26 PROCEDURE — 83631 LACTOFERRIN FECAL (QUANT): CPT

## 2020-08-26 PROCEDURE — 99285 EMERGENCY DEPT VISIT HI MDM: CPT

## 2020-08-26 PROCEDURE — 83690 ASSAY OF LIPASE: CPT

## 2020-08-26 PROCEDURE — 80053 COMPREHEN METABOLIC PANEL: CPT

## 2020-08-26 PROCEDURE — 74011000250 HC RX REV CODE- 250: Performed by: OBSTETRICS & GYNECOLOGY

## 2020-08-26 PROCEDURE — 96375 TX/PRO/DX INJ NEW DRUG ADDON: CPT

## 2020-08-26 RX ORDER — DIPHENOXYLATE HYDROCHLORIDE AND ATROPINE SULFATE 2.5; .025 MG/1; MG/1
2 TABLET ORAL
Status: DISCONTINUED | OUTPATIENT
Start: 2020-08-26 | End: 2020-08-29 | Stop reason: HOSPADM

## 2020-08-26 RX ORDER — ONDANSETRON 2 MG/ML
4 INJECTION INTRAMUSCULAR; INTRAVENOUS
Status: DISCONTINUED | OUTPATIENT
Start: 2020-08-26 | End: 2020-08-29 | Stop reason: HOSPADM

## 2020-08-26 RX ORDER — DEXTROSE MONOHYDRATE, SODIUM CHLORIDE, SODIUM LACTATE, POTASSIUM CHLORIDE, CALCIUM CHLORIDE 5; 600; 310; 179; 20 G/100ML; MG/100ML; MG/100ML; MG/100ML; MG/100ML
INJECTION, SOLUTION INTRAVENOUS CONTINUOUS
Status: DISCONTINUED | OUTPATIENT
Start: 2020-08-26 | End: 2020-08-26

## 2020-08-26 RX ORDER — DIPHENOXYLATE HYDROCHLORIDE AND ATROPINE SULFATE 2.5; .025 MG/1; MG/1
2 TABLET ORAL
Status: COMPLETED | OUTPATIENT
Start: 2020-08-26 | End: 2020-08-26

## 2020-08-26 RX ORDER — SODIUM CHLORIDE, SODIUM LACTATE, POTASSIUM CHLORIDE, CALCIUM CHLORIDE 600; 310; 30; 20 MG/100ML; MG/100ML; MG/100ML; MG/100ML
999 INJECTION, SOLUTION INTRAVENOUS CONTINUOUS
Status: DISPENSED | OUTPATIENT
Start: 2020-08-26 | End: 2020-08-26

## 2020-08-26 RX ORDER — LOPERAMIDE HYDROCHLORIDE 2 MG/1
2 CAPSULE ORAL
Status: DISCONTINUED | OUTPATIENT
Start: 2020-08-26 | End: 2020-08-26

## 2020-08-26 RX ORDER — ONDANSETRON 2 MG/ML
4 INJECTION INTRAMUSCULAR; INTRAVENOUS ONCE
Status: COMPLETED | OUTPATIENT
Start: 2020-08-26 | End: 2020-08-26

## 2020-08-26 RX ORDER — SODIUM CHLORIDE 0.9 % (FLUSH) 0.9 %
5-40 SYRINGE (ML) INJECTION AS NEEDED
Status: DISCONTINUED | OUTPATIENT
Start: 2020-08-26 | End: 2020-08-29 | Stop reason: HOSPADM

## 2020-08-26 RX ORDER — SODIUM CHLORIDE 0.9 % (FLUSH) 0.9 %
5-40 SYRINGE (ML) INJECTION EVERY 8 HOURS
Status: DISCONTINUED | OUTPATIENT
Start: 2020-08-26 | End: 2020-08-29 | Stop reason: HOSPADM

## 2020-08-26 RX ORDER — SODIUM CHLORIDE, SODIUM LACTATE, POTASSIUM CHLORIDE, CALCIUM CHLORIDE 600; 310; 30; 20 MG/100ML; MG/100ML; MG/100ML; MG/100ML
125 INJECTION, SOLUTION INTRAVENOUS CONTINUOUS
Status: DISCONTINUED | OUTPATIENT
Start: 2020-08-26 | End: 2020-08-28

## 2020-08-26 RX ORDER — ONDANSETRON 2 MG/ML
4 INJECTION INTRAMUSCULAR; INTRAVENOUS
Status: DISCONTINUED | OUTPATIENT
Start: 2020-08-26 | End: 2020-08-26

## 2020-08-26 RX ADMIN — DIPHENOXYLATE HYDROCHLORIDE AND ATROPINE SULFATE 2 TABLET: 2.5; .025 TABLET ORAL at 15:46

## 2020-08-26 RX ADMIN — DEXTROSE MONOHYDRATE, SODIUM CHLORIDE, SODIUM LACTATE, POTASSIUM CHLORIDE, CALCIUM CHLORIDE: 5; 600; 310; 179; 20 INJECTION, SOLUTION INTRAVENOUS at 05:48

## 2020-08-26 RX ADMIN — DIPHENOXYLATE HYDROCHLORIDE AND ATROPINE SULFATE 2 TABLET: 2.5; .025 TABLET ORAL at 22:20

## 2020-08-26 RX ADMIN — SODIUM CHLORIDE, SODIUM LACTATE, POTASSIUM CHLORIDE, AND CALCIUM CHLORIDE 125 ML/HR: 600; 310; 30; 20 INJECTION, SOLUTION INTRAVENOUS at 19:43

## 2020-08-26 RX ADMIN — FAMOTIDINE 20 MG: 10 INJECTION INTRAVENOUS at 20:15

## 2020-08-26 RX ADMIN — SODIUM CHLORIDE, SODIUM LACTATE, POTASSIUM CHLORIDE, AND CALCIUM CHLORIDE 999 ML/HR: 600; 310; 30; 20 INJECTION, SOLUTION INTRAVENOUS at 04:04

## 2020-08-26 RX ADMIN — SODIUM CHLORIDE, SODIUM LACTATE, POTASSIUM CHLORIDE, AND CALCIUM CHLORIDE 125 ML/HR: 600; 310; 30; 20 INJECTION, SOLUTION INTRAVENOUS at 13:00

## 2020-08-26 RX ADMIN — FAMOTIDINE 20 MG: 10 INJECTION INTRAVENOUS at 11:26

## 2020-08-26 RX ADMIN — ONDANSETRON 4 MG: 2 INJECTION INTRAMUSCULAR; INTRAVENOUS at 13:01

## 2020-08-26 RX ADMIN — DIPHENOXYLATE HYDROCHLORIDE AND ATROPINE SULFATE 2 TABLET: 2.5; .025 TABLET ORAL at 05:48

## 2020-08-26 RX ADMIN — ONDANSETRON 4 MG: 2 INJECTION INTRAMUSCULAR; INTRAVENOUS at 19:43

## 2020-08-26 RX ADMIN — ONDANSETRON 4 MG: 2 INJECTION INTRAMUSCULAR; INTRAVENOUS at 04:04

## 2020-08-26 NOTE — PROGRESS NOTES
Patient moved to room 465 for observation. POC discussed, patient denies any needs at this time call light in reach will call out PRN.

## 2020-08-26 NOTE — PROGRESS NOTES
Assessment as documented. Pt states not feeling \"as dehydrated as before, but still having abdominal cramping, vomited X2 this morning, with yellowish color. Not able to tolerate any oral foods or liquids without vomiting.

## 2020-08-26 NOTE — PROGRESS NOTES
Shift report received. Pt care assumed. Pt not currently on contact precautions. Precautions initiated until C-Diff ruled out. Pt sleeping. Did not disturb.

## 2020-08-26 NOTE — PROGRESS NOTES
Patient presents to triage from home with complaints of n/v/d for 1 week but in the last three days has gotten worse.

## 2020-08-26 NOTE — CONSULTS
Attestation signed by Rip Will MD at 08/26/20 5963 (Updated)   ADDENDUM:     Patient seen/examined and agree with below. In short pt is a healthy 25 yr old female with no chronic GI symptoms presenting with acute nausea, vomiting followed by non-bloody diarrhea and eventually turning to bloody diarrhea. With normal WBC count, LFTs, and relatively normal clinical exam other than some diffuse tenderness the diagnosis is most likely viral gastroenteritis. DDx includes IBD but less likely given lack of chronicity, GB pathology but severity of diarrhea and blood are inconsistent. Upper gi pathology is not likely given lack of NSAIDs, smoking, ETOH.     Would continue with conservative measures for now (IVF, clears, Pepcid IV) and ok to use a little imodium as c difficile is negative. If after 24 hours of IVF and bowel rest she does not improve then can consider starting empiric antibiotics and check abdominal sonogram.  Lastly, hopefully not needed but unsedated flex sig can be performed if suspicion of IBD increase.     F/up stool inflammatory markers as well as CRP. We will follow.          Please call with any acute changes in clinical status.     Rzaia Sloan MD  [facility]                           [facility] Consult Note       Primary GI Physician: Dr. Daron Cardozo    Referring Provider:  Dr. Toshia Cuellar Date:  8/26/2020    Admit Date:  8/26/2020    Chief Complaint:  Nausea, vomiting, bloody diarrhea    Subjective:     History of Present Illness:  Patient is a 25 y.o. female with PMH of but not limited to [address]  , who is seen in consultation at the request of Dr. Sylvia Damon for nausea, vomiting, bloody diarrhea. Mrs. Rao was admitted overnight for complaints of 4 day history of malaise, low grade fevers, cramping abdominal pain, nausea, vomiting, and bloody diarrhea. She is currently 18 weeks pregnant.  She denies any recent travel, antibiotics, known Covid-19 exposure, and is not working outside the home. Rectal exam without hemorrhoids. Labs with WBC 10.4, HGB 13.4, MCV 86.9, , Na 135, K 3.3, TB 0.4, AST 29, ALT 74, . Rapid covid test is negative. She reports having feelings of a fecal urgency and cramping abdominal pain and then passing a yellow watery stool with bright red blood mixed in. This started 3 days ago. Initially, she had a .2 temp. Last episode of diarrhea with BRB mixed in was less than 1 hour ago. She continues to have cramping abdominal pain. Continues to have nausea. She is not tolerating any PO intake. Denies any history of rectal bleeding. Reports that her typical bowel pattern is 3 formed brown stools weekly. She has never had a colonoscopy for any reason. She reports that she was feeling well until symptoms started 4 days ago. Denies any family history of Crohn's disease or Ulcerative colitis. Denies any family history of colon cancer/colon polyps. This note is corrected: She has never been seen in our office before. PMH:  Past Medical History:   Diagnosis Date    Primary oligomenorrhea 11/20/2013    PUPP (pruritic urticarial papules and plaques of pregnancy) 2019       PSH:  Past Surgical History:   Procedure Laterality Date    HX OTHER SURGICAL      frenulum release    HX OTHER SURGICAL      arthroscopic knee       Allergies:  No Known Allergies    Home Medications:  Prior to Admission medications    Medication Sig Start Date End Date Taking? Authorizing Provider   HYDROcodone-acetaminophen (NORCO) 5-325 mg per tablet Take 1-2 Tabs by mouth every six (6) hours as needed. Max Daily Amount: 8 Tabs. 2/13/19   Estee Fontanez MD   prenatal vit-iron fumarate-fa 27 mg iron- 0.8 mg tab tablet Take 1 Tab by mouth daily.     Provider, Historical   progesterone (PROMETRIUM) 100 mg capsule 1 po q hs prn if no period for 8-12 weeks 11/20/13   Yi Black MD       [facility] Medications:  Current Facility-Administered Medications   Medication Dose Route Frequency    sodium chloride (NS) flush 5-40 mL  5-40 mL IntraVENous Q8H    sodium chloride (NS) flush 5-40 mL  5-40 mL IntraVENous PRN    ondansetron (ZOFRAN) injection 4 mg  4 mg IntraVENous Q4H PRN    famotidine (PF) (PEPCID) 20 mg in 0.9% sodium chloride 10 mL injection  20 mg IntraVENous Q12H    diphenoxylate-atropine (LOMOTIL) tablet 2 Tab  2 Tab Oral QID PRN    lactated Ringers infusion  125 mL/hr IntraVENous CONTINUOUS       Social History:  Social History     Tobacco Use    Smoking status: Never Smoker    Smokeless tobacco: Never Used   Substance Use Topics    Alcohol use: No     Frequency: Never       Pt denies any history of drug use, blood transfusions, or tattoos. Family History:  No family history on file. Review of Systems:  A detailed 10 system ROS is obtained, with pertinent positives as listed above. All others are negative. Diet:  Clear liquids     Objective:     Physical Exam:  Vitals:  Visit Vitals  /69 (BP 1 Location: Right arm, BP Patient Position: Sitting)   Pulse 96   Temp 98.1 °F (36.7 °C)   Resp 18   SpO2 99%     Gen:  Pt is alert, cooperative, no acute distress  Skin:  Extremities and face reveal no rashes. HEENT: Sclerae anicteric. Extra-occular muscles are intact. No oral ulcers. No abnormal pigmentation of the lips. The neck is supple. Cardiovascular: Regular rate and rhythm. No murmurs, gallops, or rubs. Respiratory:  Comfortable breathing with no accessory muscle use. Clear breath sounds anteriorly with no wheezes, rales, or rhonchi. GI:  Abdomen rounded, soft, tender in epigastric region. Normal active bowel sounds. No enlargement of the liver or spleen. No masses palpable. Rectal:  Deferred  Musculoskeletal:  No pitting edema of the lower legs. Neurological:  Gross memory appears intact. Patient is alert and oriented.   Psychiatric:  Mood appears appropriate with judgement intact. Lymphatic:  No cervical or supraclavicular adenopathy. Laboratory:    Recent Labs     08/26/20  1253 08/26/20  0408   WBC 10.6 10.4   HGB 11.8 13.4   HCT 35.4* 38.5    243   MCV 88.3 86.9    135*   K 3.6 3.3*    103   CO2 26 20*   BUN 3* 6   CREA 0.48* 0.38*   CA 8.0* 8.4   * 109*    119   ALT 54 74*   TBILI 0.3 0.4   ALB 2.3* 2.7*   TP 6.0* 7.1          Assessment:     Active Problems:    Diarrhea (8/26/2020)      Rectal bleeding (8/26/2020)      19 weeks gestation of pregnancy (8/26/2020)      Pelvic pain in antepartum period in second trimester (8/26/2020)      Gastroenteritis (8/26/2020)      Antepartum dehydration (8/26/2020)       Patient is a 25 y.o. female with PMH of but not limited to PUPP , who is seen in consultation at the request of Dr. Muriel Sullivan for nausea, vomiting, diarrhea with bright red blood mixed in. She denies any significant GI history. Baseline bowel pattern is 3 formed brown stools weekly. Possible etiology includes infectious causes such as viral gastroenteritis with IH bleeding, bacterial infection, IBD. Plan:     Expand stool studies  Follow labs and correct electrolytes as needed  Supportive care with IVF, antiemetics. If this is a prolonged course, could consider a flex sig in the future to further evaluate as well as empiric antibiotics with Flagyl. Check lipase and CRP    Low Harrison NP    Patient is seen and examined in collaboration with Dr. Lionel Jordan. Assessment and plan as per Dr. Lionel Jordan.

## 2020-08-26 NOTE — PROGRESS NOTES
Antepartum Progress Note    Pt is 26 y/o  at 18w3d who was admitted early this morning with a 4 day h/o malaise, low grade fevers, cramping abdominal pain, nausea, vomiting, and bloody diarrhea. Pt denies prior similar symptoms. She denies any recent travel, antibiotic therapy, or COVID-19 exposure. Pt notes that she continues with nausea, vomiting, and bloody diarrhea approximately every 30 minutes. Vitals: Temp (24hrs), Av.7 °F (36.5 °C), Min:97.3 °F (36.3 °C), Max:98.1 °F (36.7 °C)     Patient Vitals for the past 24 hrs:   BP   20 0923 115/69   20 0608 108/67         I&O:   No intake/output data recorded. No intake/output data recorded. Exam:  Patient without distress. Abdomen soft, non-tender               Fundus soft and non tender               Fundal height 6-1QL below the umbilicus               Right upper quadrant non-tender               Lower extremities edema No                   Labs:  CBC:    Recent Labs     20  0408   WBC 10.4   HGB 13.4   HCT 38.5          CMP:   Recent Labs     20  0408   *   K 3.3*      CO2 20*   AGAP 12   *   BUN 6   CREA 0.38*   GFRAA >60   GFRNA >60   CA 8.4   ALB 2.7*   TP 7.1   GLOB 4.4*   AGRAT 0.6*   ALT 74*           Recent Glucose Results:   Recent Labs     20  0408   *         Prenatal Labs:    Lab Results   Component Value Date/Time    Rubella, External Immune 2018    GrBStrep, External Positive urine 2018    HBsAg, External Negative 2018    HIV, External Non-Reactive 2018    RPR, External Non-Reactive 2018           Assessment and Plan:    Pt is 26 y/o  at 18w3d with a 4 day h/o malaise, fevers, abdominal pain, nausea/vomiting, and bloody diarrhea. Continue IV fluid/potassium replacement. Administer zofran/lomotil every 4 hours. Recheck CBC and BMP this afternoon. Obtain COVID-19 testing.     Obtain GI consult; message left requesting consult.

## 2020-08-26 NOTE — PROGRESS NOTES
Chart reviewed - patient admitted at 18w3d for observation due to rectal bleed/diarrhea. ENID: 01/24/21. No needs identified in chart review. SW will continue to follow and provide support as indicated.     NATHANIEL Garvin-JOCELYNE  [facility]   [phone]

## 2020-08-26 NOTE — CONSULTS
[facility] Consult Note Primary GI Physician: Dr. Alia Natarajan Referring Provider:  Dr. Trna Murillo Consult Date:  8/26/2020 Admit Date:  8/26/2020 Chief Complaint:  Nausea, vomiting, bloody diarrhea Subjective:  
 
History of Present Illness:  Patient is a 25 y.o. female with PMH of but not limited to PUPP  , who is seen in consultation at the request of Dr. Tran Murillo for nausea, vomiting, bloody diarrhea. Mrs. Rao was admitted overnight for complaints of 4 day history of malaise, low grade fevers, cramping abdominal pain, nausea, vomiting, and bloody diarrhea. She is currently 18 weeks pregnant. She denies any recent travel, antibiotics, known Covid-19 exposure, and is not working outside the home. Rectal exam without hemorrhoids. Labs with WBC 10.4, HGB 13.4, MCV 86.9, , Na 135, K 3.3, TB 0.4, AST 29, ALT 74, . Rapid covid test is negative. She reports having feelings of a fecal urgency and cramping abdominal pain and then passing a yellow watery stool with bright red blood mixed in. This started 3 days ago. Initially, she had a .2 temp. Last episode of diarrhea with BRB mixed in was less than 1 hour ago. She continues to have cramping abdominal pain. Continues to have nausea. She is not tolerating any PO intake. Denies any history of rectal bleeding. Reports that her typical bowel pattern is 3 formed brown stools weekly. She has never had a colonoscopy for any reason. She reports that she was feeling well until symptoms started 4 days ago. She underwent an EGD with Dr. Alia Natarajan in June 2013 for complaints of RUQ pain, nausea, vomiting. EGD showed mild gastritis, ? Bile gastritis, small gastric polyp that was biopsied, prepyloric prominent fold, mild duodenitis, biopsies were obtained in the second portion to evaluate for celiac disease. Biopsies were unremarkable. Gastric polyp was a fundic gland polyp. Denies any family history of Crohn's disease or Ulcerative colitis. Denies any family history of colon cancer/colon polyps. PMH: 
Past Medical History:  
Diagnosis Date  Primary oligomenorrhea 11/20/2013  PUPP (pruritic urticarial papules and plaques of pregnancy) 2019 PSH: 
Past Surgical History:  
Procedure Laterality Date  HX OTHER SURGICAL    
 frenulum release  HX OTHER SURGICAL    
 arthroscopic knee Allergies: 
No Known Allergies Home Medications: 
Prior to Admission medications Medication Sig Start Date End Date Taking? Authorizing Provider HYDROcodone-acetaminophen (NORCO) 5-325 mg per tablet Take 1-2 Tabs by mouth every six (6) hours as needed. Max Daily Amount: 8 Tabs. 2/13/19   Deysi Osborne MD  
prenatal vit-iron fumarate-fa 27 mg iron- 0.8 mg tab tablet Take 1 Tab by mouth daily. Provider, Historical  
progesterone (PROMETRIUM) 100 mg capsule 1 po q hs prn if no period for 8-12 weeks 11/20/13   Sheila Foster MD  
 
 
Hospital Medications: 
Current Facility-Administered Medications Medication Dose Route Frequency  sodium chloride (NS) flush 5-40 mL  5-40 mL IntraVENous Q8H  
 sodium chloride (NS) flush 5-40 mL  5-40 mL IntraVENous PRN  
 dextrose 5% - LR with KCl 20 mEq/L infusion   IntraVENous CONTINUOUS  
 ondansetron (ZOFRAN) injection 4 mg  4 mg IntraVENous Q4H PRN  
 loperamide (IMODIUM) capsule 2 mg  2 mg Oral Q4H PRN  
 famotidine (PF) (PEPCID) 20 mg in 0.9% sodium chloride 10 mL injection  20 mg IntraVENous Q12H Social History: 
Social History Tobacco Use  Smoking status: Never Smoker  Smokeless tobacco: Never Used Substance Use Topics  Alcohol use: No  
  Frequency: Never Pt denies any history of drug use, blood transfusions, or tattoos. Family History: No family history on file. Review of Systems: A detailed 10 system ROS is obtained, with pertinent positives as listed above. All others are negative. Diet:  Clear liquids Objective:  
 
Physical Exam: 
Vitals: 
Visit Vitals /69 (BP 1 Location: Right arm, BP Patient Position: Sitting) Pulse 96 Temp 98.1 °F (36.7 °C) Resp 18 SpO2 99% Gen:  Pt is alert, cooperative, no acute distress Skin:  Extremities and face reveal no rashes. HEENT: Sclerae anicteric. Extra-occular muscles are intact. No oral ulcers. No abnormal pigmentation of the lips. The neck is supple. Cardiovascular: Regular rate and rhythm. No murmurs, gallops, or rubs. Respiratory:  Comfortable breathing with no accessory muscle use. Clear breath sounds anteriorly with no wheezes, rales, or rhonchi. GI:  Abdomen rounded, soft, tender in epigastric region. Normal active bowel sounds. No enlargement of the liver or spleen. No masses palpable. Rectal:  Deferred Musculoskeletal:  No pitting edema of the lower legs. Neurological:  Gross memory appears intact. Patient is alert and oriented. Psychiatric:  Mood appears appropriate with judgement intact. Lymphatic:  No cervical or supraclavicular adenopathy. Laboratory:   
Recent Labs  
  08/26/20 
0408 WBC 10.4 HGB 13.4 HCT 38.5  MCV 86.9 * K 3.3*  
 CO2 20* BUN 6  
CREA 0.38* CA 8.4 *  ALT 74* TBILI 0.4 ALB 2.7* TP 7.1 Assessment:  
 
Active Problems: 
  Diarrhea (8/26/2020) Rectal bleeding (8/26/2020) 19 weeks gestation of pregnancy (8/26/2020) Pelvic pain in antepartum period in second trimester (8/26/2020) Gastroenteritis (8/26/2020) Antepartum dehydration (8/26/2020) Patient is a 25 y.o. female with PMH of but not limited to [address] , who is seen in consultation at the request of Dr. Ilene Banks for nausea, vomiting, diarrhea with bright red blood mixed in. She denies any significant GI history. Baseline bowel pattern is 3 formed brown stools weekly.  Possible etiology includes infectious causes such as viral gastroenteritis with IH bleeding, bacterial infection, IBD. Plan:  
 
Expand stool studies Follow labs and correct electrolytes as needed Supportive care with IVF, antiemetics. If this is a prolonged course, could consider a flex sig in the future to further evaluate as well as empiric antibiotics with Flagyl. Check lipase and CRP Trlibby Soto NP Patient is seen and examined in collaboration with Dr. Maxine Santiago. Assessment and plan as per Dr. Maxine Santiago.

## 2020-08-26 NOTE — PROGRESS NOTES
Call to Dr. Pina Smyth to review plan of care for pt. Consult put into perfect serve and GI on call number provided to MD. Medication and lab orders received.

## 2020-08-27 PROCEDURE — 74011250636 HC RX REV CODE- 250/636: Performed by: OBSTETRICS & GYNECOLOGY

## 2020-08-27 PROCEDURE — 74011250637 HC RX REV CODE- 250/637: Performed by: OBSTETRICS & GYNECOLOGY

## 2020-08-27 PROCEDURE — C9113 INJ PANTOPRAZOLE SODIUM, VIA: HCPCS | Performed by: OBSTETRICS & GYNECOLOGY

## 2020-08-27 PROCEDURE — 96376 TX/PRO/DX INJ SAME DRUG ADON: CPT

## 2020-08-27 PROCEDURE — 74011000250 HC RX REV CODE- 250: Performed by: OBSTETRICS & GYNECOLOGY

## 2020-08-27 PROCEDURE — 99218 HC RM OBSERVATION: CPT

## 2020-08-27 RX ORDER — LIDOCAINE HYDROCHLORIDE 20 MG/ML
10 SOLUTION OROPHARYNGEAL
Status: DISCONTINUED | OUTPATIENT
Start: 2020-08-27 | End: 2020-08-27

## 2020-08-27 RX ORDER — MAG HYDROX/ALUMINUM HYD/SIMETH 200-200-20
20 SUSPENSION, ORAL (FINAL DOSE FORM) ORAL
Status: DISCONTINUED | OUTPATIENT
Start: 2020-08-27 | End: 2020-08-27

## 2020-08-27 RX ORDER — ONDANSETRON 4 MG/1
8 TABLET, ORALLY DISINTEGRATING ORAL EVERY 12 HOURS
Status: DISCONTINUED | OUTPATIENT
Start: 2020-08-27 | End: 2020-08-28

## 2020-08-27 RX ORDER — AMOXICILLIN AND CLAVULANATE POTASSIUM 875; 125 MG/1; MG/1
1 TABLET, FILM COATED ORAL EVERY 12 HOURS
Status: DISCONTINUED | OUTPATIENT
Start: 2020-08-27 | End: 2020-08-29 | Stop reason: HOSPADM

## 2020-08-27 RX ORDER — SIMETHICONE 80 MG
80 TABLET,CHEWABLE ORAL
Status: DISCONTINUED | OUTPATIENT
Start: 2020-08-27 | End: 2020-08-29 | Stop reason: HOSPADM

## 2020-08-27 RX ORDER — SUCRALFATE 1 G/1
1 TABLET ORAL
Status: DISCONTINUED | OUTPATIENT
Start: 2020-08-27 | End: 2020-08-28

## 2020-08-27 RX ADMIN — ONDANSETRON 8 MG: 4 TABLET, ORALLY DISINTEGRATING ORAL at 11:29

## 2020-08-27 RX ADMIN — SUCRALFATE 1 G: 1 TABLET ORAL at 17:24

## 2020-08-27 RX ADMIN — SODIUM CHLORIDE, SODIUM LACTATE, POTASSIUM CHLORIDE, AND CALCIUM CHLORIDE 125 ML/HR: 600; 310; 30; 20 INJECTION, SOLUTION INTRAVENOUS at 11:37

## 2020-08-27 RX ADMIN — FAMOTIDINE 20 MG: 10 INJECTION INTRAVENOUS at 08:32

## 2020-08-27 RX ADMIN — LIDOCAINE HYDROCHLORIDE 10 ML: 20 SOLUTION ORAL; TOPICAL at 11:49

## 2020-08-27 RX ADMIN — SODIUM CHLORIDE 40 MG: 9 INJECTION INTRAMUSCULAR; INTRAVENOUS; SUBCUTANEOUS at 11:29

## 2020-08-27 RX ADMIN — SIMETHICONE 80 MG: 80 TABLET, CHEWABLE ORAL at 18:13

## 2020-08-27 RX ADMIN — AMOXICILLIN AND CLAVULANATE POTASSIUM 1 TABLET: 875; 125 TABLET, FILM COATED ORAL at 13:32

## 2020-08-27 RX ADMIN — DIPHENOXYLATE HYDROCHLORIDE AND ATROPINE SULFATE 2 TABLET: 2.5; .025 TABLET ORAL at 08:33

## 2020-08-27 RX ADMIN — ALUMINUM HYDROXIDE, MAGNESIUM HYDROXIDE, AND SIMETHICONE 20 ML: 200; 200; 20 SUSPENSION ORAL at 11:49

## 2020-08-27 RX ADMIN — AMOXICILLIN AND CLAVULANATE POTASSIUM 1 TABLET: 875; 125 TABLET, FILM COATED ORAL at 21:04

## 2020-08-27 RX ADMIN — SODIUM CHLORIDE, SODIUM LACTATE, POTASSIUM CHLORIDE, AND CALCIUM CHLORIDE 125 ML/HR: 600; 310; 30; 20 INJECTION, SOLUTION INTRAVENOUS at 19:24

## 2020-08-27 RX ADMIN — FAMOTIDINE 20 MG: 10 INJECTION INTRAVENOUS at 21:05

## 2020-08-27 RX ADMIN — ONDANSETRON 8 MG: 4 TABLET, ORALLY DISINTEGRATING ORAL at 21:04

## 2020-08-27 RX ADMIN — SUCRALFATE 1 G: 1 TABLET ORAL at 21:05

## 2020-08-27 NOTE — PROGRESS NOTES
Obstetrics Progress Note    Name: Mily Aj MRN: [id number]  SSN: [id number]    YOB: 1995  Age: 25 y.o. Sex: female      Subjective:Continued abdominal pain and cramping. Continued blood and \"tissue\" per rectum. Continued nausea and vomiting. LOS: 0 days    Estimated Date of Delivery: 21   Gestational Age Today: 23w3d     Patient admitted for nausea, vomiting and bloody diarrhea on . Pt reported onset of nausea and vomiting on  night. She has not had solid food since then. She then developed bloody diarrhea which she reports is just blood and tissue for the last 48 hours. She thinks that food poisoning is unlikely as multiple other people who ate the same items that she did were not ill. Objective:     Vitals:  Blood pressure 123/87, pulse (!) 111, temperature 98.9 °F (37.2 °C), resp. rate 18, SpO2 99 %, unknown if currently breastfeeding. Temp (24hrs), Av.6 °F (37 °C), Min:98.2 °F (36.8 °C), Max:98.9 °F (08.1 °C)    Systolic (33MYN), TII:806 , Min:123 , XVR:183      Diastolic (39FUJ), DENEEN:91, Min:86, Max:87       Intake and Output:   Has not been recorded      Physical Exam:  Patient without distress.   Abdomen: soft, nondistended, gravid  Fundus: soft and non tender  Lower Extremities:  - Edema No         Labs:   Recent Results (from the past 36 hour(s))   CBC WITH AUTOMATED DIFF    Collection Time: 20  4:08 AM   Result Value Ref Range    WBC 10.4 4.3 - 11.1 K/uL    RBC 4.43 4.05 - 5.2 M/uL    HGB 13.4 11.7 - 15.4 g/dL    HCT 38.5 35.8 - 46.3 %    MCV 86.9 79.6 - 97.8 FL    MCH 30.2 26.1 - 32.9 PG    MCHC 34.8 31.4 - 35.0 g/dL    RDW 13.3 11.9 - 14.6 %    PLATELET 096 972 - 998 K/uL    MPV 9.9 9.4 - 12.3 FL    ABSOLUTE NRBC 0.00 0.0 - 0.2 K/uL    DF AUTOMATED      NEUTROPHILS 85 (H) 43 - 78 %    LYMPHOCYTES 9 (L) 13 - 44 %    MONOCYTES 5 4.0 - 12.0 %    EOSINOPHILS 0 (L) 0.5 - 7.8 %    BASOPHILS 0 0.0 - 2.0 %    IMMATURE GRANULOCYTES 1 0.0 - 5.0 % ABS. NEUTROPHILS 8.8 (H) 1.7 - 8.2 K/UL    ABS. LYMPHOCYTES 1.0 0.5 - 4.6 K/UL    ABS. MONOCYTES 0.5 0.1 - 1.3 K/UL    ABS. EOSINOPHILS 0.0 0.0 - 0.8 K/UL    ABS. BASOPHILS 0.0 0.0 - 0.2 K/UL    ABS. IMM. GRANS. 0.1 0.0 - 0.5 K/UL   METABOLIC PANEL, COMPREHENSIVE    Collection Time: 08/26/20  4:08 AM   Result Value Ref Range    Sodium 135 (L) 136 - 145 mmol/L    Potassium 3.3 (L) 3.5 - 5.1 mmol/L    Chloride 103 98 - 107 mmol/L    CO2 20 (L) 21 - 32 mmol/L    Anion gap 12 7 - 16 mmol/L    Glucose 109 (H) 65 - 100 mg/dL    BUN 6 6 - 23 MG/DL    Creatinine 0.38 (L) 0.6 - 1.0 MG/DL    GFR est AA >60 >60 ml/min/1.73m2    GFR est non-AA >60 >60 ml/min/1.73m2    Calcium 8.4 8.3 - 10.4 MG/DL    Bilirubin, total 0.4 0.2 - 1.1 MG/DL    ALT (SGPT) 74 (H) 12 - 65 U/L    AST (SGOT) 29 15 - 37 U/L    Alk. phosphatase 119 50 - 136 U/L    Protein, total 7.1 6.3 - 8.2 g/dL    Albumin 2.7 (L) 3.5 - 5.0 g/dL    Globulin 4.4 (H) 2.3 - 3.5 g/dL    A-G Ratio 0.6 (L) 1.2 - 3.5     C. DIFFICILE AG & TOXIN A/B    Collection Time: 08/26/20  5:31 AM   Result Value Ref Range    GDH ANTIGEN C. DIFFICILE GDH ANTIGEN-NEGATIVE      C. difficile toxin C. DIFFICILE TOXIN-NEGATIVE      PCR Reflex NOT APPLICABLE      INTERPRETATION NEGATIVE FOR TOXIGENIC C. DIFFICILE NTXCD      Clinical Consideration NEGATIVE FOR TOXIGENIC C. DIFFICILE     MISC.  LAB TEST    Collection Time: 08/26/20  5:32 AM   Result Value Ref Range    Test Description: TEST [id number] STOOL CULTURE     Reference Lab: [email or web address]      Results: PENDING    SARS-COV-2    Collection Time: 08/26/20 10:29 AM   Result Value Ref Range    Specimen source Nasopharyngeal      COVID-19 rapid test Not detected NOTD      SARS CoV-2 PENDING    CBC WITH AUTOMATED DIFF    Collection Time: 08/26/20 12:53 PM   Result Value Ref Range    WBC 10.6 4.3 - 11.1 K/uL    RBC 4.01 (L) 4.05 - 5.2 M/uL    HGB 11.8 11.7 - 15.4 g/dL    HCT 35.4 (L) 35.8 - 46.3 %    MCV 88.3 79.6 - 97.8 FL    MCH 29.4 26.1 - 32.9 PG    MCHC 33.3 31.4 - 35.0 g/dL    RDW 13.6 11.9 - 14.6 %    PLATELET 157 053 - 619 K/uL    MPV 10.0 9.4 - 12.3 FL    ABSOLUTE NRBC 0.00 0.0 - 0.2 K/uL    DF AUTOMATED      NEUTROPHILS 82 (H) 43 - 78 %    LYMPHOCYTES 9 (L) 13 - 44 %    MONOCYTES 8 4.0 - 12.0 %    EOSINOPHILS 0 (L) 0.5 - 7.8 %    BASOPHILS 0 0.0 - 2.0 %    IMMATURE GRANULOCYTES 1 0.0 - 5.0 %    ABS. NEUTROPHILS 8.7 (H) 1.7 - 8.2 K/UL    ABS. LYMPHOCYTES 1.0 0.5 - 4.6 K/UL    ABS. MONOCYTES 0.8 0.1 - 1.3 K/UL    ABS. EOSINOPHILS 0.0 0.0 - 0.8 K/UL    ABS. BASOPHILS 0.0 0.0 - 0.2 K/UL    ABS. IMM. GRANS. 0.1 0.0 - 0.5 K/UL   METABOLIC PANEL, COMPREHENSIVE    Collection Time: 20 12:53 PM   Result Value Ref Range    Sodium 138 136 - 145 mmol/L    Potassium 3.6 3.5 - 5.1 mmol/L    Chloride 106 98 - 107 mmol/L    CO2 26 21 - 32 mmol/L    Anion gap 6 (L) 7 - 16 mmol/L    Glucose 117 (H) 65 - 100 mg/dL    BUN 3 (L) 6 - 23 MG/DL    Creatinine 0.48 (L) 0.6 - 1.0 MG/DL    GFR est AA >60 >60 ml/min/1.73m2    GFR est non-AA >60 >60 ml/min/1.73m2    Calcium 8.0 (L) 8.3 - 10.4 MG/DL    Bilirubin, total 0.3 0.2 - 1.1 MG/DL    ALT (SGPT) 54 12 - 65 U/L    AST (SGOT) 18 15 - 37 U/L    Alk. phosphatase 101 50 - 136 U/L    Protein, total 6.0 (L) 6.3 - 8.2 g/dL    Albumin 2.3 (L) 3.5 - 5.0 g/dL    Globulin 3.7 (H) 2.3 - 3.5 g/dL    A-G Ratio 0.6 (L) 1.2 - 3.5     C REACTIVE PROTEIN, QT    Collection Time: 20 12:53 PM   Result Value Ref Range    C-Reactive protein 6.8 (H) 0.0 - 0.9 mg/dL   LIPASE    Collection Time: 20 12:53 PM   Result Value Ref Range    Lipase 73 73 - 393 U/L   MISC. LAB TEST    Collection Time: 20  3:05 PM   Result Value Ref Range    Test Description: STOOL      Reference Lab: STOOL      Results: PENDING        Assessment and Plan: 24 yo  at 18+4 admitted with nausea, vomiting, crampy abdominal pain, and bloody stool. 1. Pt has been seen by GI. Case reviewed with on call GI physician today who agreed with starting antibiotics.  Will start Augmentin 875 bid. Hold off on scope for now. 2. C. Diff negative. 3. Ova and parasite testing pending. 4. PPI added. 5. Maalox and viscous lidocaine ordered x 1. Per GI recommendations, will change to Carafate 1gm  PO QID. 6. Zofran 8mg bid scheduled.        Active Problems:    Diarrhea (8/26/2020)      Rectal bleeding (8/26/2020)      19 weeks gestation of pregnancy (8/26/2020)      Pelvic pain in antepartum period in second trimester (8/26/2020)      Gastroenteritis (8/26/2020)      Antepartum dehydration (8/26/2020)

## 2020-08-27 NOTE — PROGRESS NOTES
11:49  Medication Given  lidocaine (XYLOCAINE) 2 % viscous solution 10 mL -  Dose: 10 mL ; Route: Mouth/Throat  Sundar Ramos RN    11:49  Medication Given  alum-mag hydroxide-simeth (MYLANTA) oral suspension 20 mL -  Dose: 20 mL ; Route: Oral  Gabriella TRAVIS Ravi

## 2020-08-27 NOTE — PROGRESS NOTES
11:29  Medication Given  ondansetron (ZOFRAN ODT) tablet 8 mg -  Dose: 8 mg ; Route: Oral ; Scheduled Time: 1100  Jesus Ramos RN    11:29  Medication Given  pantoprazole (PROTONIX) 40 mg in 0.9% sodium chloride 10 mL injection -  Dose: 40 mg ; Route: IntraVENous ; Line: Peripheral IV 08/26/20 Right Hand ; Scheduled Time: 1200  Bo Kim RN

## 2020-08-27 NOTE — PROGRESS NOTES
[facility] Progress Note         Admit Date:  8/26/2020    Today's Date:  8/27/2020    CC:  Nausea, vomiting, bloody diarrhea    Subjective:     Patient reports continue abd crampy pain R>L. Also continued BRRB, feels like more blood today than yesterday. Hgb slightly lower at 11.8 from 13.4 yesterday. Nausea improved with antiemetics. ABD pain worse with PO intake. Medications:   Current Facility-Administered Medications   Medication Dose Route Frequency    sodium chloride (NS) flush 5-40 mL  5-40 mL IntraVENous Q8H    sodium chloride (NS) flush 5-40 mL  5-40 mL IntraVENous PRN    ondansetron (ZOFRAN) injection 4 mg  4 mg IntraVENous Q4H PRN    famotidine (PF) (PEPCID) 20 mg in 0.9% sodium chloride 10 mL injection  20 mg IntraVENous Q12H    diphenoxylate-atropine (LOMOTIL) tablet 2 Tab  2 Tab Oral QID PRN    lactated Ringers infusion  125 mL/hr IntraVENous CONTINUOUS    promethazine (PHENERGAN) with saline injection 25 mg  25 mg IntraVENous Q6H PRN       Review of Systems:  ROS was obtained, with pertinent positives as listed above. No chest pain or SOB. Diet:  Clears    Objective:   Vitals:  Visit Vitals  /86   Pulse 99   Temp 98.2 °F (36.8 °C)   Resp 18   SpO2 99%     Intake/Output:  No intake/output data recorded. No intake/output data recorded. Exam:  General appearance: alert, cooperative, no distress  Lungs: clear to auscultation bilaterally anteriorly  Heart: regular rate and rhythm  Abdomen: soft, ttp RUQ.  Bowel sounds normal. No masses, no organomegaly  Extremities: extremities normal, atraumatic, no cyanosis or edema  Neuro:  alert and oriented    Data Review (Labs):    Recent Labs     08/26/20  1253 08/26/20  0408   WBC 10.6 10.4   HGB 11.8 13.4   HCT 35.4* 38.5    243   MCV 88.3 86.9    135*   K 3.6 3.3*    103   CO2 26 20*   BUN 3* 6   CREA 0.48* 0.38*   CA 8.0* 8.4   * 109*    119   ALT 54 74*   TBILI 0.3 0.4   ALB 2.3* 2.7*   TP 6.0* 7.1   LPSE 73  --        Assessment:     Active Problems:    Diarrhea (8/26/2020)      Rectal bleeding (8/26/2020)      19 weeks gestation of pregnancy (8/26/2020)      Pelvic pain in antepartum period in second trimester (8/26/2020)      Gastroenteritis (8/26/2020)      Antepartum dehydration (8/26/2020)      Patient is a 25 y.o. female with PMH of but not limited to [address] , who is seen in consultation at the request of Dr. Yousif Dudley for nausea, vomiting, diarrhea with bright red blood mixed in. She denies any significant GI history. Baseline bowel pattern is 3 formed brown stools weekly. Possible etiology includes infectious causes such as viral gastroenteritis with IH bleeding, bacterial infection, IBD. Plan:      - Stool C Diff neg, O&P pending  - Follow labs and correct electrolytes as needed  - Supportive care with IVF, antiemetics. - If this is a prolonged course, could consider a flex sig in the future to further evaluate as well as empiric antibiotics with Flagyl.  - Lipase normal at 73 and CRP elevated at 6.8  - If nor improvement consider sonogram and antibiotics      Syed Sarmiento NP   Patient is seen and examined in collaboration with Dr. Ryan Opitz.   Assessment and plan as per Dr. Brunilda Alexandra.

## 2020-08-27 NOTE — PROGRESS NOTES
Checked on pt now and throughout the day. Pt c/o \"gas pain. \" Was encouraged to ambulate and reported that walking the halls did help. Simethicone was also ordered during the day. Pain is the worst in the RLQ.      Afeb, slight increase in pulse, BP and RR wnl  Abdomen: Soft, ND, BS wnl    24 yo  at 18+4wks admitted with N/V/bloody diarrhea.   -Stool number has decreased significantly today.   -Pt able to tolerate some bland PO today  -Simethicone and ambulation recommended for gas pain/distention

## 2020-08-27 NOTE — PROGRESS NOTES
17:24  Medication Given  sucralfate (CARAFATE) tablet 1 g -  Dose: 1 g ; Route: Oral ; Scheduled Time: 1630  John Ramos RN    18:13  Medication Given  simethicone (MYLICON) tablet 80 mg -  Dose: 80 mg ; Route: Oral  Shavonne Farnsworth RN

## 2020-08-28 LAB
COVID-19 RAPID TEST, COVR: NOT DETECTED
HCT VFR BLD AUTO: 34.6 % (ref 35.8–46.3)
HGB BLD-MCNC: 11.6 G/DL (ref 11.7–15.4)
SARS COV-2, XPGCVT: NEGATIVE
SOURCE, COVRS: NORMAL

## 2020-08-28 PROCEDURE — 96365 THER/PROPH/DIAG IV INF INIT: CPT

## 2020-08-28 PROCEDURE — 74011250636 HC RX REV CODE- 250/636: Performed by: OBSTETRICS & GYNECOLOGY

## 2020-08-28 PROCEDURE — 74011000250 HC RX REV CODE- 250: Performed by: OBSTETRICS & GYNECOLOGY

## 2020-08-28 PROCEDURE — 74011000258 HC RX REV CODE- 258: Performed by: OBSTETRICS & GYNECOLOGY

## 2020-08-28 PROCEDURE — 96366 THER/PROPH/DIAG IV INF ADDON: CPT

## 2020-08-28 PROCEDURE — 74011250637 HC RX REV CODE- 250/637: Performed by: OBSTETRICS & GYNECOLOGY

## 2020-08-28 PROCEDURE — 85018 HEMOGLOBIN: CPT

## 2020-08-28 PROCEDURE — C9113 INJ PANTOPRAZOLE SODIUM, VIA: HCPCS | Performed by: OBSTETRICS & GYNECOLOGY

## 2020-08-28 PROCEDURE — 36415 COLL VENOUS BLD VENIPUNCTURE: CPT

## 2020-08-28 PROCEDURE — 96376 TX/PRO/DX INJ SAME DRUG ADON: CPT

## 2020-08-28 PROCEDURE — 99218 HC RM OBSERVATION: CPT

## 2020-08-28 RX ORDER — ZOLPIDEM TARTRATE 5 MG/1
5 TABLET ORAL
Status: DISCONTINUED | OUTPATIENT
Start: 2020-08-28 | End: 2020-08-29 | Stop reason: HOSPADM

## 2020-08-28 RX ADMIN — AMOXICILLIN AND CLAVULANATE POTASSIUM 1 TABLET: 875; 125 TABLET, FILM COATED ORAL at 09:16

## 2020-08-28 RX ADMIN — SODIUM CHLORIDE 40 MG: 9 INJECTION INTRAMUSCULAR; INTRAVENOUS; SUBCUTANEOUS at 09:16

## 2020-08-28 RX ADMIN — FAMOTIDINE 20 MG: 10 INJECTION INTRAVENOUS at 22:00

## 2020-08-28 RX ADMIN — SIMETHICONE 80 MG: 80 TABLET, CHEWABLE ORAL at 09:16

## 2020-08-28 RX ADMIN — SODIUM CHLORIDE, SODIUM LACTATE, POTASSIUM CHLORIDE, AND CALCIUM CHLORIDE 125 ML/HR: 600; 310; 30; 20 INJECTION, SOLUTION INTRAVENOUS at 03:15

## 2020-08-28 RX ADMIN — AMOXICILLIN AND CLAVULANATE POTASSIUM 1 TABLET: 875; 125 TABLET, FILM COATED ORAL at 22:00

## 2020-08-28 RX ADMIN — POTASSIUM CHLORIDE: 2 INJECTION, SOLUTION, CONCENTRATE INTRAVENOUS at 12:29

## 2020-08-28 NOTE — PROGRESS NOTES
Pt reports 4 to 5 loose BM with bright ref blood with one. She was able to eat some applesauce and jello.

## 2020-08-28 NOTE — PROGRESS NOTES
Ante Partum High Risk Pregnancy Note    Patient admitted for antepartum dehydartion, vomiting and bloody diarrhea. States she does not  have  headache , contractions, vaginal bleeding , swelling and vaginal leaking of fluid . Pt is improved somewhat today with decrease in frequency of diarrhea and tolerating small amounts of bland diet. Now complaining gas pain and \"stomach pain. \" Continues to have pelvic pressure. Vitals: Temp (24hrs), Av.3 °F (36.8 °C), Min:98 °F (36.7 °C), Max:98.7 °F (37.1 °C)     Patient Vitals for the past 24 hrs:   BP   20 0916 117/75   20 1927 135/86   20 1334 121/74       I&O:   No intake/output data recorded.  1901 -  0700  In: 1300 [P.O.:300; I.V.:1000]  Out: 1650     Exam:  Patient without distress. Chest: CTA   Cardiac: RRR               Abdomen: soft, mild tenderness throughout, no guarding, no rebound               Fundus: soft and non tender                              Right Upper Quadrant: non-tender               Perineum: No sign of blood or amniotic fluid               Lower Extremities: No edema, neg clint's               Patellar Reflexes: 2+ bilaterally               Clonus: absent               Fetal Monitoring: +   Psych: awake , alert, appropriate, cheerful   Skin- no rashes or lesions           Lab/Data Review: All lab results for the last 24 hours reviewed.    Recent Results (from the past 12 hour(s))   HGB & HCT    Collection Time: 20  8:45 AM   Result Value Ref Range    HGB 11.6 (L) 11.7 - 15.4 g/dL    HCT 34.6 (L) 35.8 - 46.3 %       Assessment and Plan:      Principal Problem:    Rectal bleeding (2020)    Active Problems:    Diarrhea (2020)      Gastroenteritis (2020)      Antepartum dehydration (2020)      Pelvic pain in antepartum period in second trimester (2020)      19 weeks gestation of pregnancy (2020)          Current Facility-Administered Medications:     famotidine (PF) (PEPCID) 20 mg in 0.9% sodium chloride 10 mL injection, 20 mg, IntraVENous, QHS, Dayana Cody MD    dextrose 5% and 0.9% NaCl 1,000 mL with mvi, adult no. 4 with vit K 10 mL, thiamine 987 mg, folic acid 1 mg, potassium chloride 20 mEq infusion, , IntraVENous, ONCE, Sixto Willams MD    pantoprazole (PROTONIX) 40 mg in 0.9% sodium chloride 10 mL injection, 40 mg, IntraVENous, DAILY, Dayana Cody MD, 40 mg at 08/28/20 3971    amoxicillin-clavulanate (AUGMENTIN) 875-125 mg per tablet 1 Tab, 1 Tab, Oral, Q12H, Dayana Cody MD, 1 Tab at 08/28/20 0916    simethicone (MYLICON) tablet 80 mg, 80 mg, Oral, QID PRN, Dayana Cody MD, 80 mg at 08/28/20 0916    sodium chloride (NS) flush 5-40 mL, 5-40 mL, IntraVENous, Q8H, Sixto Willams MD    sodium chloride (NS) flush 5-40 mL, 5-40 mL, IntraVENous, PRN, Sixto Willams MD    [facility]ansetron [facility] injection 4 mg, 4 mg, IntraVENous, Q4H PRN, Shiraz Schulte MD, 4 mg at 08/26/20 1943    diphenoxylate-atropine (LOMOTIL) tablet 2 Tab, 2 Tab, Oral, QID PRN, Regis Dozier MD, 2 Tab at 08/27/20 7666    promethazine (PHENERGAN) with saline injection 25 mg, 25 mg, IntraVENous, Q6H PRN, Shiraz Rust MD    - pt somewhat improved; continue to advance diet  - augmentin started  - continue pepcid and protonix  - banana bag today  - repeat labs in am    Thank you for GI consult.  Of note, pt could have Sedated flex sig if indicated

## 2020-08-28 NOTE — PROGRESS NOTES
Pt seen this AM. Limited exam. Complete note later per . Pt reports very little sleep due to gas pain overnight. Feels that Zofran made her symptoms worse. Does not report improvement from Carafate. Did have one episode of diarrhea this AM that did not have blood. Will d/c Zofran and Carafate. Continue Augmentin, Pepcid and PPI as scheduled. Lomotil prn.

## 2020-08-28 NOTE — PROGRESS NOTES
[facility] Progress Note         Admit Date:  8/26/2020    Today's Date:  8/28/2020    CC:  Nausea, vomiting, bloody diarrhea    Subjective:     Patient reports continue abd crampy pain R>L, slightly worse. ABD pain worse with PO intake. Reports small loose BM w/o blood. No nausea this am.  Has eaten a small amt of applesauce. Feels antiemetics making abd cramping worse. Medications:   Current Facility-Administered Medications   Medication Dose Route Frequency    famotidine (PF) (PEPCID) 20 mg in 0.9% sodium chloride 10 mL injection  20 mg IntraVENous QHS    pantoprazole (PROTONIX) 40 mg in 0.9% sodium chloride 10 mL injection  40 mg IntraVENous DAILY    amoxicillin-clavulanate (AUGMENTIN) 875-125 mg per tablet 1 Tab  1 Tab Oral Q12H    simethicone (MYLICON) tablet 80 mg  80 mg Oral QID PRN    sodium chloride (NS) flush 5-40 mL  5-40 mL IntraVENous Q8H    sodium chloride (NS) flush 5-40 mL  5-40 mL IntraVENous PRN    ondansetron (ZOFRAN) injection 4 mg  4 mg IntraVENous Q4H PRN    diphenoxylate-atropine (LOMOTIL) tablet 2 Tab  2 Tab Oral QID PRN    lactated Ringers infusion  125 mL/hr IntraVENous CONTINUOUS    promethazine (PHENERGAN) with saline injection 25 mg  25 mg IntraVENous Q6H PRN       Review of Systems:  ROS was obtained, with pertinent positives as listed above. No chest pain or SOB. Diet:  GI Soft    Objective:   Vitals:  Visit Vitals  /86 (BP 1 Location: Left arm, BP Patient Position: At rest)   Pulse (!) 113   Temp 98 °F (36.7 °C)   Resp 18   SpO2 99%     Intake/Output:  No intake/output data recorded. 08/26 1901 - 08/28 0700  In: 1300 [P.O.:300; I.V.:1000]  Out: 1650   Exam:  General appearance: alert, cooperative, no distress  Lungs: clear to auscultation bilaterally anteriorly  Heart: regular rate and rhythm  Abdomen: soft, ttp RUQ.  Bowel sounds normal. No masses, no organomegaly  Extremities: extremities normal, atraumatic, no cyanosis or edema  Neuro:  alert and oriented    Data Review (Labs):    Recent Labs     08/26/20  1253 08/26/20  0408   WBC 10.6 10.4   HGB 11.8 13.4   HCT 35.4* 38.5    243   MCV 88.3 86.9    135*   K 3.6 3.3*    103   CO2 26 20*   BUN 3* 6   CREA 0.48* 0.38*   CA 8.0* 8.4   * 109*    119   ALT 54 74*   TBILI 0.3 0.4   ALB 2.3* 2.7*   TP 6.0* 7.1   LPSE 73  --        Assessment:     Active Problems:    Diarrhea (8/26/2020)      Rectal bleeding (8/26/2020)      19 weeks gestation of pregnancy (8/26/2020)      Pelvic pain in antepartum period in second trimester (8/26/2020)      Gastroenteritis (8/26/2020)      Antepartum dehydration (8/26/2020)      Patient is a 25 y.o. female with PMH of but not limited to [place] , who is seen in consultation at the request of Dr. Royce Cordero for nausea, vomiting, diarrhea with bright red blood mixed in. She denies any significant GI history. Baseline bowel pattern is 3 formed brown stools weekly. Possible etiology includes infectious causes such as viral gastroenteritis with IH bleeding, bacterial infection, IBD. Augmentin and Carafate started yesterday. Plan:      - Stool C Diff neg, O&P and  fecal calprotectin pending  - Follow labs and correct electrolytes as needed  - Supportive care with IVF, antiemetics. - Lipase normal at 73 and CRP elevated at 6.8  - If nor improvement consider sonogram or un sedated flex sig.  - Will re check hgb    Roberto Grace NP   Patient is seen and examined in collaboration with Dr. Lupe Alcala.   Assessment and plan as per Dr. Dickson Munoz.

## 2020-08-29 VITALS
OXYGEN SATURATION: 99 % | SYSTOLIC BLOOD PRESSURE: 110 MMHG | TEMPERATURE: 98.1 F | DIASTOLIC BLOOD PRESSURE: 67 MMHG | HEART RATE: 107 BPM | RESPIRATION RATE: 16 BRPM

## 2020-08-29 LAB
ALBUMIN SERPL-MCNC: 2 G/DL (ref 3.5–5)
ALBUMIN/GLOB SERPL: 0.6 {RATIO} (ref 1.2–3.5)
ALP SERPL-CCNC: 118 U/L (ref 50–130)
ALT SERPL-CCNC: 26 U/L (ref 12–65)
ANION GAP SERPL CALC-SCNC: 5 MMOL/L (ref 7–16)
AST SERPL-CCNC: 16 U/L (ref 15–37)
BASOPHILS # BLD: 0 K/UL (ref 0–0.2)
BASOPHILS NFR BLD: 0 % (ref 0–2)
BILIRUB SERPL-MCNC: 0.6 MG/DL (ref 0.2–1.1)
BUN SERPL-MCNC: 2 MG/DL (ref 6–23)
CALCIUM SERPL-MCNC: 8 MG/DL (ref 8.3–10.4)
CHLORIDE SERPL-SCNC: 108 MMOL/L (ref 98–107)
CO2 SERPL-SCNC: 28 MMOL/L (ref 21–32)
CREAT SERPL-MCNC: 0.36 MG/DL (ref 0.6–1)
DIFFERENTIAL METHOD BLD: ABNORMAL
EOSINOPHIL # BLD: 0.2 K/UL (ref 0–0.8)
EOSINOPHIL NFR BLD: 2 % (ref 0.5–7.8)
ERYTHROCYTE [DISTWIDTH] IN BLOOD BY AUTOMATED COUNT: 13.7 % (ref 11.9–14.6)
GLOBULIN SER CALC-MCNC: 3.4 G/DL (ref 2.3–3.5)
GLUCOSE SERPL-MCNC: 83 MG/DL (ref 65–100)
HCT VFR BLD AUTO: 34.6 % (ref 35.8–46.3)
HGB BLD-MCNC: 11.8 G/DL (ref 11.7–15.4)
IMM GRANULOCYTES # BLD AUTO: 0 K/UL (ref 0–0.5)
IMM GRANULOCYTES NFR BLD AUTO: 0 % (ref 0–5)
LYMPHOCYTES # BLD: 2.6 K/UL (ref 0.5–4.6)
LYMPHOCYTES NFR BLD: 26 % (ref 13–44)
MAGNESIUM SERPL-MCNC: 2 MG/DL (ref 1.8–2.4)
MCH RBC QN AUTO: 30.6 PG (ref 26.1–32.9)
MCHC RBC AUTO-ENTMCNC: 34.1 G/DL (ref 31.4–35)
MCV RBC AUTO: 89.6 FL (ref 79.6–97.8)
MONOCYTES # BLD: 0.5 K/UL (ref 0.1–1.3)
MONOCYTES NFR BLD: 5 % (ref 4–12)
NEUTS SEG # BLD: 6.8 K/UL (ref 1.7–8.2)
NEUTS SEG NFR BLD: 67 % (ref 43–78)
NRBC # BLD: 0 K/UL (ref 0–0.2)
PLATELET # BLD AUTO: 214 K/UL (ref 150–450)
PMV BLD AUTO: 9.4 FL (ref 9.4–12.3)
POTASSIUM SERPL-SCNC: 3.3 MMOL/L (ref 3.5–5.1)
PROT SERPL-MCNC: 5.4 G/DL (ref 6.3–8.2)
RBC # BLD AUTO: 3.86 M/UL (ref 4.05–5.2)
SODIUM SERPL-SCNC: 141 MMOL/L (ref 136–145)
WBC # BLD AUTO: 10.2 K/UL (ref 4.3–11.1)

## 2020-08-29 PROCEDURE — 36415 COLL VENOUS BLD VENIPUNCTURE: CPT

## 2020-08-29 PROCEDURE — 83735 ASSAY OF MAGNESIUM: CPT

## 2020-08-29 PROCEDURE — 99218 HC RM OBSERVATION: CPT

## 2020-08-29 PROCEDURE — 85025 COMPLETE CBC W/AUTO DIFF WBC: CPT

## 2020-08-29 PROCEDURE — 96366 THER/PROPH/DIAG IV INF ADDON: CPT

## 2020-08-29 PROCEDURE — 80053 COMPREHEN METABOLIC PANEL: CPT

## 2020-08-29 PROCEDURE — 74011250637 HC RX REV CODE- 250/637: Performed by: OBSTETRICS & GYNECOLOGY

## 2020-08-29 RX ORDER — PANTOPRAZOLE SODIUM 40 MG/1
40 TABLET, DELAYED RELEASE ORAL DAILY
Status: DISCONTINUED | OUTPATIENT
Start: 2020-08-29 | End: 2020-08-29 | Stop reason: HOSPADM

## 2020-08-29 RX ADMIN — AMOXICILLIN AND CLAVULANATE POTASSIUM 1 TABLET: 875; 125 TABLET, FILM COATED ORAL at 09:39

## 2020-08-29 RX ADMIN — PANTOPRAZOLE SODIUM 40 MG: 40 TABLET, DELAYED RELEASE ORAL at 09:39

## 2020-08-29 NOTE — PROGRESS NOTES
Pt given prescription for Protonix 40mg and Augmentin 875mg. Discharge instructions regarding dehydration and bland diet reviewed. Pt verbalizes understanding. Pt informed to follow up with [facility] and [facility] within 1-2weeks. Pt given both phone numbers and verbalizes understanding. Pt ambulated out of hospital and family picked her up.

## 2020-08-29 NOTE — PROGRESS NOTES
Overall feeling better. Tolerating bland diet . Some diarrhea, but not as much. No nausea or vomiting. Stool cultures pending.

## 2020-08-29 NOTE — PROGRESS NOTES
[facility] Progress Note         Admit Date:  8/26/2020    Today's Date:  8/29/2020    CC:  Nausea, vomiting, bloody diarrhea    Subjective:     Patient reports no further N/V but has ongoing abd pains which are worse when she drinks or eats. Had watery BM with blood mixed in (which is improved in that before, she states, she passed only pure blood per rectum). She ate two applesauce containers and sweet potato (?) for entirety of yesterday (not much in volume)    Medications:   Current Facility-Administered Medications   Medication Dose Route Frequency    famotidine (PF) (PEPCID) 20 mg in 0.9% sodium chloride 10 mL injection  20 mg IntraVENous QHS    zolpidem (AMBIEN) tablet 5 mg  5 mg Oral QHS PRN    pantoprazole (PROTONIX) 40 mg in 0.9% sodium chloride 10 mL injection  40 mg IntraVENous DAILY    amoxicillin-clavulanate (AUGMENTIN) 875-125 mg per tablet 1 Tab  1 Tab Oral Q12H    simethicone (MYLICON) tablet 80 mg  80 mg Oral QID PRN    sodium chloride (NS) flush 5-40 mL  5-40 mL IntraVENous Q8H    sodium chloride (NS) flush 5-40 mL  5-40 mL IntraVENous PRN    ondansetron (ZOFRAN) injection 4 mg  4 mg IntraVENous Q4H PRN    diphenoxylate-atropine (LOMOTIL) tablet 2 Tab  2 Tab Oral QID PRN    promethazine (PHENERGAN) with saline injection 25 mg  25 mg IntraVENous Q6H PRN       Review of Systems:  ROS was obtained, with pertinent positives as listed above. No chest pain or SOB. Diet:  GI Soft    Objective:   Vitals:  Visit Vitals  /75   Pulse (!) 109   Temp 97.9 °F (36.6 °C)   Resp 16   SpO2 99%     Intake/Output:  No intake/output data recorded. No intake/output data recorded.   Exam:      Data Review (Labs):    Recent Labs     08/29/20  0547 08/28/20  0845 08/26/20  1253   WBC 10.2  --  10.6   HGB 11.8 11.6* 11.8   HCT 34.6* 34.6* 35.4*     --  230   MCV 89.6  --  88.3     --  138   K 3.3*  --  3.6   *  --  106   CO2 28  --  26   BUN 2*  --  3*   CREA 0.36*  --  0.48*   CA 8.0*  --  8.0*   MG 2.0  --   --    GLU 83  --  117*     --  101   ALT 26  --  54   TBILI 0.6  --  0.3   ALB 2.0*  --  2.3*   TP 5.4*  --  6.0*   LPSE  --   --  73       Assessment:     Principal Problem:    Rectal bleeding (8/26/2020)    Active Problems:    Diarrhea (8/26/2020)      19 weeks gestation of pregnancy (8/26/2020)      Pelvic pain in antepartum period in second trimester (8/26/2020)      Gastroenteritis (8/26/2020)      Antepartum dehydration (8/26/2020)      Patient is a 25 y.o. female with PMH of but not limited to [address] , who is seen in consultation at the request of Dr. Cheryl Pagan for nausea, vomiting, diarrhea with bright red blood mixed in. She denies any significant GI history. Baseline bowel pattern is 3 formed brown stools weekly. Possible etiology includes infectious causes such as viral gastroenteritis with IH bleeding, bacterial infection, IBD. Augmentin and Carafate started yesterday. Plan:      - Stool C Diff neg,   - O&P and  fecal calprotectin from 8-26-20 still pending  - Follow labs and correct electrolytes as needed  - Supportive care with IVF, antiemetics.    - Lipase normal at 73 and CRP elevated at 6.8  - If nor improvement consider sonogram or un sedated flex sig but I suspect we may be able to avoid this since she does have some symptomatic improvement      Oscar Grey MD

## 2020-08-29 NOTE — DISCHARGE SUMMARY
Obstetrical Discharge Summary     Name: Manny Slaughter MRN: [id number]  SSN: [id number]    YOB: 1995  Age: 25 y.o. Sex: female      Allergies: Patient has no known allergies. Admit Date: 8/26/2020    Discharge Date: 8/29/2020     Admitting Physician: Nicole Skinner MD     Attending Physician:  Marley Lui, *     * Admission Diagnoses: Rectal bleeding [K62.5]  Diarrhea [R19.7]  19 weeks gestation of pregnancy [Z3A.19]  Pelvic pain in antepartum period in second trimester [O26.892, R10.2]  Gastroenteritis [K52.9]  Antepartum dehydration [O26.899, E86.0]  19 weeks gestation of pregnancy [Z3A.19]  Pelvic pain in antepartum period in second trimester [O26.892, R10.2]    * Discharge Diagnoses:   LUCIO    Additional Diagnoses:   Hospital Problems as of 8/29/2020 Date Reviewed: 2/13/2019          Codes Class Noted - Resolved POA    Diarrhea ICD-10-CM: R19.7  ICD-9-CM: 787.91  8/26/2020 - Present Unknown        * (Principal) Rectal bleeding ICD-10-CM: K62.5  ICD-9-CM: 569.3  8/26/2020 - Present Unknown        19 weeks gestation of pregnancy ICD-10-CM: Z3A.19  ICD-9-CM: V22.2  8/26/2020 - Present Unknown        Pelvic pain in antepartum period in second trimester ICD-10-CM: O26.892, R10.2  ICD-9-CM: 646.83, 625.9  8/26/2020 - Present Unknown        Gastroenteritis ICD-10-CM: K52.9  ICD-9-CM: 558.9  8/26/2020 - Present Unknown        Antepartum dehydration ICD-10-CM: O26.899, E86.0  ICD-9-CM: 646.83, 276.51  8/26/2020 - Present Unknown             Lab Results   Component Value Date/Time    ABO/Rh(D) O POSITIVE 02/11/2019 07:23 AM    Rubella, External Immune 07/09/2018    GrBStrep, External Positive urine 07/09/2018    There is no immunization history for the selected administration types on file for this patient. * Procedures: none           * Discharge Condition: good    [facility] Course: Pt was admitted with malaise, nausea, vomiting, diarrhea, and rectal bleeding.  She was treated with IV fluids, antiemetics, protonix, and antibiotics. GI consult was obtained. At the time of discharge pt was tolerating a regular diet. The nausea, vomiting, and diarrhea had resolved. PE at the time of discharge:  Gen: A&O times 3 in NAD  Heart: RRR  Lungs: CTA  Abd: gravid, soft, ND, NT  LE: No evidence of DVT    * Disposition: Home    Discharge Medications:   Augmentin 875mg po bid for 5 days  Protonix 40mg po daily    * Follow-up Care/Patient Instructions:   Activity: Activity as tolerated  Diet: Regular Diet  Wound Care: None needed    Follow Up: Dr. Brian Ayers with in 1-2 weeks  GI physician within 1-2 weeks

## 2020-08-29 NOTE — DISCHARGE INSTRUCTIONS
Patient Education   Patient Education   Patient Education   Follow Up with Dr Shanna Santos ([phone]) and with [facility] ([phone]) within the week. Soft-Textured, [place] Diet: Care Instructions  Your Care Instructions     A soft-textured, bland diet is used when you need food that is easy to chew, swallow, and digest. You will need to choose soft foods that are low in spices and seasonings. You will need to avoid high-fat foods, as well as caffeine and alcohol. Your doctor or dietitian can help you plan a soft-textured, bland diet based on your health and what you prefer to eat. Ask your doctor how long you should stay on this diet. As you get better, you will probably be able to go back to a regular diet. Talk with your doctor or dietitian before you make changes in your diet. Follow-up care is a key part of your treatment and safety. Be sure to make and go to all appointments, and call your doctor if you are having problems. It's also a good idea to know your test results and keep a list of the medicines you take. How can you care for yourself at home? · Choose foods that are easy to chew and swallow. Good choices are mashed potatoes, soft breads and rolls, cream soups, oatmeal, and Cream of Wheat. · Choose soft, well-cooked vegetables and soft or canned fruits. Good choices are applesauce, ripe bananas, and non-citrus fruit juice. · Try milk, yogurt, or other milk products, if you can digest dairy without too many problems. Your doctor may limit milk and milk products for a while. If so, he or she may recommend a calcium and vitamin D supplement. · Choose soft protein foods such as eggs, tofu, steamed fish, chicken, and turkey. Slow-cooking methods, such as stewing, will help soften meat. Chopping meat in a  or  also will make it easier to eat. · Avoid nuts, raw vegetables, hard crackers, tough meats, and prunes and prune juice.   · Avoid foods that are very spicy, such as foods seasoned with black pepper, chili peppers, horseradish, or hot sauce. · Avoid highly acidic foods such as citrus fruits, citrus fruit juices, and tomato-based foods. · Avoid high-fat foods such as fried meat, chips, and rich desserts. · Check with your doctor before you drink alcohol or beverages that have caffeine, such as coffee, tea, and cola beverages. Where can you learn more? Go to http://www.gray.com/  Enter G519 in the search box to learn more about \"Soft-Textured, [place] Diet: Care Instructions. \"  Current as of: August 22, 2019               Content Version: 12.5  © 2171-5246 [employer]. Care instructions adapted under license by [employer] (which disclaims liability or warranty for this information). If you have questions about a medical condition or this instruction, always ask your healthcare professional. [place] [address] any warranty or liability for your use of this information. Dehydration: Care Instructions  Your Care Instructions  Dehydration happens when your body loses too much fluid. This might happen when you do not drink enough water or you lose large amounts of fluids from your body because of diarrhea, vomiting, or sweating. Severe dehydration can be life-threatening. Water and minerals called electrolytes help put your body fluids back in balance. Learn the early signs of fluid loss, and drink more fluids to prevent dehydration. Follow-up care is a key part of your treatment and safety. Be sure to make and go to all appointments, and call your doctor if you are having problems. It's also a good idea to know your test results and keep a list of the medicines you take. How can you care for yourself at home? · To prevent dehydration, drink plenty of fluids, enough so that your urine is light yellow or clear like water. Choose water and other caffeine-free clear liquids until you feel better.  If you have kidney, heart, or liver disease and have to limit fluids, talk with your doctor before you increase the amount of fluids you drink. · If you do not feel like eating or drinking, try taking small sips of water, sports drinks, or other rehydration drinks. · Get plenty of rest.  To prevent dehydration  · Add more fluids to your diet and daily routine, unless your doctor has told you not to. · During hot weather, drink more fluids. Drink even more fluids if you exercise a lot. Stay away from drinks with alcohol or caffeine. · Watch for the symptoms of dehydration. These include:  ? A dry, sticky mouth. ? Dark yellow urine, and not much of it. ? Dry and sunken eyes. ? Feeling very tired. · Learn what problems can lead to dehydration. These include:  ? Diarrhea, fever, and vomiting. ? Any illness with a fever, such as pneumonia or the flu. ? Activities that cause heavy sweating, such as endurance races and heavy outdoor work in hot or humid weather. ? Alcohol or drug use or problems caused by quitting their use (withdrawal). ? Certain medicines, such as cold and allergy pills (antihistamines), diet pills (diuretics), and laxatives. ? Certain diseases, such as diabetes, cancer, and heart or kidney disease. When should you call for help? [id number] anytime you think you may need emergency care. For example, call if:  · You passed out (lost consciousness). Call your doctor now or seek immediate medical care if:  · You are confused and cannot think clearly. · You are dizzy or lightheaded, or you feel like you may faint. · You have signs of needing more fluids. You have sunken eyes and a dry mouth, and you pass only a little dark urine. · You cannot keep fluids down. Watch closely for changes in your health, and be sure to contact your doctor if:  · You are not making tears. · Your skin is very dry and sags slowly back into place after you pinch it. · Your mouth and eyes are very dry.   Where can you learn more? Go to http://kathy-evelyn.info/  Enter Q814 in the search box to learn more about \"Dehydration: Care Instructions. \"  Current as of: June 26, 2019               Content Version: 12.5  © 1437-3715 [employer]. Care instructions adapted under license by [employer] (which disclaims liability or warranty for this information). If you have questions about a medical condition or this instruction, always ask your healthcare professional. [address] any warranty or liability for your use of this information. Weeks 18 to 22 of Your Pregnancy: Care Instructions  Your Care Instructions    Your baby is continuing to develop quickly. At this stage, babies can now suck their thumbs,  firmly with their hands, and open and close their eyelids. Sometime between 18 and 22 weeks, you will start to feel your baby move. At first, these small fetal movements feel like fluttering or \"butterflies. \" Some women say that they feel like gas bubbles. As the baby grows, these movements will become stronger. You may also notice that your baby kicks and hiccups. During this time, you may find that your nausea and fatigue are gone. Overall, you may feel better and have more energy than you did in your first trimester. But you may also have new discomforts now, such as sleep problems or leg cramps. This care sheet can help you ease these discomforts. Follow-up care is a key part of your treatment and safety. Be sure to make and go to all appointments, and call your doctor if you are having problems. It's also a good idea to know your test results and keep a list of the medicines you take. How can you care for yourself at home? Ease sleep problems  · Avoid caffeine in drinks or chocolate late in the day. · Get some exercise every day. · Take a warm shower or bath before bed. · Have a light snack or glass of milk at bedtime.   · Do relaxation exercises in bed to calm your mind and body. · Support your legs and back with extra pillows. Try a pillow between your legs if you sleep on your side. · Do not use sleeping pills or alcohol. They could harm your baby. Ease leg cramps  · Do not massage your calf during the cramp. · Sit on a firm bed or chair. Straighten your leg, and bend your foot (flex your ankle) slowly upward, toward your knee. Bend your toes up and down. · Stand on a cool, flat surface. Stretch your toes upward, and take small steps walking on your heels. · Use a heating pad or hot water bottle to help with muscle ache. Prevent leg cramps  · Be sure to get enough calcium. If you are worried that you are not getting enough, talk to your doctor. · Exercise every day, and stretch your legs before bed. · Take a warm bath before bed, and try leg warmers at night. Where can you learn more? Go to http://kathy-evelyn.info/  Enter [id number] in the search box to learn more about \"Weeks 18 to 22 of Your Pregnancy: Care Instructions. \"  Current as of: May 29, 2019Content Version: 12.4  © 1975-5381 [employer]. Care instructions adapted under license by [employer] (which disclaims liability or warranty for this information). If you have questions about a medical condition or this instruction, always ask your healthcare professional. [place] [address] any warranty or liability for your use of this information.

## 2020-08-31 LAB
BACTERIA SPEC CULT: NORMAL
Lab: NORMAL
REFERENCE LAB,REFLB: NORMAL
SERVICE CMNT-IMP: NORMAL
TEST DESCRIPTION:,ATST: NORMAL

## 2020-09-02 LAB
O+P SPEC MICRO: NORMAL
O+P STL CONC: NORMAL
SPECIMEN SOURCE: NORMAL

## 2020-09-07 LAB — CALPROTECTIN STL-MCNT: 161 UG/G (ref 0–120)

## 2020-09-08 LAB — LACTOFERRIN, LCTFLT: 53.89 UG/ML(G) (ref 0–7.24)

## 2020-10-12 ENCOUNTER — TRANSCRIBE ORDER (OUTPATIENT)
Dept: SCHEDULING | Age: 25
End: 2020-10-12

## 2020-10-12 DIAGNOSIS — R00.0 TACHYCARDIA, UNSPECIFIED: Primary | ICD-10-CM

## 2020-11-18 NOTE — PROGRESS NOTES
Assessment complete per Doc Flowsheet. WNL.  Norco given po for pain 2/10 per patient request.   
 [] : Fellow

## 2020-12-15 LAB — GRBS, EXTERNAL: NORMAL

## 2021-01-19 ENCOUNTER — ANESTHESIA (OUTPATIENT)
Dept: LABOR AND DELIVERY | Age: 26
End: 2021-01-19
Payer: COMMERCIAL

## 2021-01-19 ENCOUNTER — HOSPITAL ENCOUNTER (INPATIENT)
Age: 26
LOS: 1 days | Discharge: HOME | End: 2021-01-20
Attending: OBSTETRICS & GYNECOLOGY | Admitting: OBSTETRICS & GYNECOLOGY
Payer: COMMERCIAL

## 2021-01-19 ENCOUNTER — ANESTHESIA EVENT (OUTPATIENT)
Dept: LABOR AND DELIVERY | Age: 26
End: 2021-01-19
Payer: COMMERCIAL

## 2021-01-19 PROBLEM — O99.820 GROUP B STREPTOCOCCUS CARRIER STATE AFFECTING PREGNANCY: Status: ACTIVE | Noted: 2021-01-19

## 2021-01-19 LAB
ABO + RH BLD: NORMAL
BLOOD GROUP ANTIBODIES SERPL: NORMAL
ERYTHROCYTE [DISTWIDTH] IN BLOOD BY AUTOMATED COUNT: 14.6 % (ref 11.9–14.6)
HCT VFR BLD AUTO: 36.4 % (ref 35.8–46.3)
HGB BLD-MCNC: 11.4 G/DL (ref 11.7–15.4)
MCH RBC QN AUTO: 25.4 PG (ref 26.1–32.9)
MCHC RBC AUTO-ENTMCNC: 31.3 G/DL (ref 31.4–35)
MCV RBC AUTO: 81.3 FL (ref 79.6–97.8)
NRBC # BLD: 0 K/UL (ref 0–0.2)
PLATELET # BLD AUTO: 231 K/UL (ref 150–450)
PMV BLD AUTO: 10.8 FL (ref 9.4–12.3)
RBC # BLD AUTO: 4.48 M/UL (ref 4.05–5.2)
SPECIMEN EXP DATE BLD: NORMAL
WBC # BLD AUTO: 7.8 K/UL (ref 4.3–11.1)

## 2021-01-19 PROCEDURE — 2709999900 HC NON-CHARGEABLE SUPPLY

## 2021-01-19 PROCEDURE — 10907ZC DRAINAGE OF AMNIOTIC FLUID, THERAPEUTIC FROM PRODUCTS OF CONCEPTION, VIA NATURAL OR ARTIFICIAL OPENING: ICD-10-PCS | Performed by: OBSTETRICS & GYNECOLOGY

## 2021-01-19 PROCEDURE — 74011250637 HC RX REV CODE- 250/637: Performed by: OBSTETRICS & GYNECOLOGY

## 2021-01-19 PROCEDURE — 76060000078 HC EPIDURAL ANESTHESIA

## 2021-01-19 PROCEDURE — 77030011945 HC CATH URIN INT ST MENT -A

## 2021-01-19 PROCEDURE — A4300 CATH IMPL VASC ACCESS PORTAL: HCPCS | Performed by: ANESTHESIOLOGY

## 2021-01-19 PROCEDURE — 65270000029 HC RM PRIVATE

## 2021-01-19 PROCEDURE — 85027 COMPLETE CBC AUTOMATED: CPT

## 2021-01-19 PROCEDURE — 74011000250 HC RX REV CODE- 250: Performed by: ANESTHESIOLOGY

## 2021-01-19 PROCEDURE — 75410000003 HC RECOV DEL/VAG/CSECN EA 0.5 HR

## 2021-01-19 PROCEDURE — 75410000000 HC DELIVERY VAGINAL/SINGLE

## 2021-01-19 PROCEDURE — 74011250636 HC RX REV CODE- 250/636: Performed by: OBSTETRICS & GYNECOLOGY

## 2021-01-19 PROCEDURE — 4A1HXCZ MONITORING OF PRODUCTS OF CONCEPTION, CARDIAC RATE, EXTERNAL APPROACH: ICD-10-PCS | Performed by: OBSTETRICS & GYNECOLOGY

## 2021-01-19 PROCEDURE — 77030014125 HC TY EPDRL BBMI -B: Performed by: ANESTHESIOLOGY

## 2021-01-19 PROCEDURE — 00HU33Z INSERTION OF INFUSION DEVICE INTO SPINAL CANAL, PERCUTANEOUS APPROACH: ICD-10-PCS | Performed by: ANESTHESIOLOGY

## 2021-01-19 PROCEDURE — 75410000002 HC LABOR FEE PER 1 HR

## 2021-01-19 PROCEDURE — 86850 RBC ANTIBODY SCREEN: CPT

## 2021-01-19 PROCEDURE — 3E033VJ INTRODUCTION OF OTHER HORMONE INTO PERIPHERAL VEIN, PERCUTANEOUS APPROACH: ICD-10-PCS | Performed by: OBSTETRICS & GYNECOLOGY

## 2021-01-19 PROCEDURE — 74011250636 HC RX REV CODE- 250/636: Performed by: STUDENT IN AN ORGANIZED HEALTH CARE EDUCATION/TRAINING PROGRAM

## 2021-01-19 PROCEDURE — 74011000258 HC RX REV CODE- 258: Performed by: OBSTETRICS & GYNECOLOGY

## 2021-01-19 RX ORDER — SODIUM CHLORIDE 0.9 % (FLUSH) 0.9 %
5-40 SYRINGE (ML) INJECTION AS NEEDED
Status: DISCONTINUED | OUTPATIENT
Start: 2021-01-19 | End: 2021-01-19

## 2021-01-19 RX ORDER — OXYCODONE HYDROCHLORIDE 5 MG/1
5 TABLET ORAL
Status: DISCONTINUED | OUTPATIENT
Start: 2021-01-19 | End: 2021-01-20 | Stop reason: HOSPADM

## 2021-01-19 RX ORDER — BUTORPHANOL TARTRATE 2 MG/ML
1 INJECTION INTRAMUSCULAR; INTRAVENOUS
Status: DISCONTINUED | OUTPATIENT
Start: 2021-01-19 | End: 2021-01-19 | Stop reason: HOSPADM

## 2021-01-19 RX ORDER — ROPIVACAINE HYDROCHLORIDE 2 MG/ML
INJECTION, SOLUTION EPIDURAL; INFILTRATION; PERINEURAL
Status: DISCONTINUED | OUTPATIENT
Start: 2021-01-19 | End: 2021-01-19 | Stop reason: HOSPADM

## 2021-01-19 RX ORDER — DEXTROSE, SODIUM CHLORIDE, SODIUM LACTATE, POTASSIUM CHLORIDE, AND CALCIUM CHLORIDE 5; .6; .31; .03; .02 G/100ML; G/100ML; G/100ML; G/100ML; G/100ML
125 INJECTION, SOLUTION INTRAVENOUS CONTINUOUS
Status: DISCONTINUED | OUTPATIENT
Start: 2021-01-19 | End: 2021-01-19

## 2021-01-19 RX ORDER — ONDANSETRON 4 MG/1
4 TABLET, ORALLY DISINTEGRATING ORAL
Status: DISCONTINUED | OUTPATIENT
Start: 2021-01-19 | End: 2021-01-20 | Stop reason: HOSPADM

## 2021-01-19 RX ORDER — LIDOCAINE HYDROCHLORIDE 10 MG/ML
1 INJECTION INFILTRATION; PERINEURAL
Status: DISCONTINUED | OUTPATIENT
Start: 2021-01-19 | End: 2021-01-19 | Stop reason: HOSPADM

## 2021-01-19 RX ORDER — DIPHENHYDRAMINE HCL 25 MG
25 CAPSULE ORAL
Status: DISCONTINUED | OUTPATIENT
Start: 2021-01-19 | End: 2021-01-20 | Stop reason: HOSPADM

## 2021-01-19 RX ORDER — OXYTOCIN/RINGER'S LACTATE 30/500 ML
10 PLASTIC BAG, INJECTION (ML) INTRAVENOUS AS NEEDED
Status: DISCONTINUED | OUTPATIENT
Start: 2021-01-19 | End: 2021-01-19

## 2021-01-19 RX ORDER — MINERAL OIL
120 OIL (ML) ORAL
Status: COMPLETED | OUTPATIENT
Start: 2021-01-19 | End: 2021-01-19

## 2021-01-19 RX ORDER — OXYTOCIN/RINGER'S LACTATE 30/500 ML
87.3 PLASTIC BAG, INJECTION (ML) INTRAVENOUS AS NEEDED
Status: DISCONTINUED | OUTPATIENT
Start: 2021-01-19 | End: 2021-01-19

## 2021-01-19 RX ORDER — OXYTOCIN/RINGER'S LACTATE 30/500 ML
0-20 PLASTIC BAG, INJECTION (ML) INTRAVENOUS
Status: DISCONTINUED | OUTPATIENT
Start: 2021-01-19 | End: 2021-01-19 | Stop reason: HOSPADM

## 2021-01-19 RX ORDER — FAMOTIDINE 20 MG/1
20 TABLET, FILM COATED ORAL 2 TIMES DAILY
Status: DISCONTINUED | OUTPATIENT
Start: 2021-01-19 | End: 2021-01-19

## 2021-01-19 RX ORDER — LIDOCAINE HYDROCHLORIDE 20 MG/ML
JELLY TOPICAL
Status: DISCONTINUED | OUTPATIENT
Start: 2021-01-19 | End: 2021-01-19 | Stop reason: HOSPADM

## 2021-01-19 RX ORDER — ROPIVACAINE HYDROCHLORIDE 2 MG/ML
INJECTION, SOLUTION EPIDURAL; INFILTRATION; PERINEURAL AS NEEDED
Status: DISCONTINUED | OUTPATIENT
Start: 2021-01-19 | End: 2021-01-19 | Stop reason: HOSPADM

## 2021-01-19 RX ORDER — IBUPROFEN 600 MG/1
600 TABLET ORAL EVERY 6 HOURS
Status: DISCONTINUED | OUTPATIENT
Start: 2021-01-19 | End: 2021-01-20 | Stop reason: HOSPADM

## 2021-01-19 RX ORDER — ACETAMINOPHEN 325 MG/1
650 TABLET ORAL
Status: DISCONTINUED | OUTPATIENT
Start: 2021-01-19 | End: 2021-01-20 | Stop reason: HOSPADM

## 2021-01-19 RX ORDER — SIMETHICONE 80 MG
80 TABLET,CHEWABLE ORAL
Status: DISCONTINUED | OUTPATIENT
Start: 2021-01-19 | End: 2021-01-20 | Stop reason: HOSPADM

## 2021-01-19 RX ORDER — LIDOCAINE HYDROCHLORIDE AND EPINEPHRINE 15; 5 MG/ML; UG/ML
INJECTION, SOLUTION EPIDURAL
Status: COMPLETED | OUTPATIENT
Start: 2021-01-19 | End: 2021-01-19

## 2021-01-19 RX ORDER — DOCUSATE SODIUM 100 MG/1
100 CAPSULE, LIQUID FILLED ORAL 2 TIMES DAILY
Status: DISCONTINUED | OUTPATIENT
Start: 2021-01-19 | End: 2021-01-20 | Stop reason: HOSPADM

## 2021-01-19 RX ORDER — PRENATAL VIT 96/IRON FUM/FOLIC 27MG-0.8MG
1 TABLET ORAL DAILY
Status: DISCONTINUED | OUTPATIENT
Start: 2021-01-20 | End: 2021-01-20 | Stop reason: HOSPADM

## 2021-01-19 RX ORDER — SODIUM CHLORIDE 0.9 % (FLUSH) 0.9 %
5-40 SYRINGE (ML) INJECTION EVERY 8 HOURS
Status: DISCONTINUED | OUTPATIENT
Start: 2021-01-19 | End: 2021-01-19

## 2021-01-19 RX ADMIN — IBUPROFEN 600 MG: 600 TABLET, FILM COATED ORAL at 15:17

## 2021-01-19 RX ADMIN — FAMOTIDINE 20 MG: 20 TABLET, FILM COATED ORAL at 09:56

## 2021-01-19 RX ADMIN — IBUPROFEN 600 MG: 600 TABLET, FILM COATED ORAL at 21:02

## 2021-01-19 RX ADMIN — SODIUM CHLORIDE, SODIUM LACTATE, POTASSIUM CHLORIDE, CALCIUM CHLORIDE, AND DEXTROSE MONOHYDRATE 125 ML/HR: 600; 310; 30; 20; 5 INJECTION, SOLUTION INTRAVENOUS at 06:50

## 2021-01-19 RX ADMIN — OXYTOCIN 2 MILLI-UNITS/MIN: 10 INJECTION INTRAVENOUS at 07:02

## 2021-01-19 RX ADMIN — SODIUM CHLORIDE 5 MILLION UNITS: 900 INJECTION INTRAVENOUS at 07:06

## 2021-01-19 RX ADMIN — Medication 3 ML: at 09:07

## 2021-01-19 RX ADMIN — ROPIVACAINE HYDROCHLORIDE 7 ML/HR: 2 INJECTION, SOLUTION EPIDURAL; INFILTRATION at 09:07

## 2021-01-19 RX ADMIN — SODIUM CHLORIDE 2.5 MILLION UNITS: 9 INJECTION, SOLUTION INTRAVENOUS at 10:23

## 2021-01-19 RX ADMIN — MINERAL OIL 120 ML: 1000 LIQUID ORAL at 10:56

## 2021-01-19 RX ADMIN — LIDOCAINE HYDROCHLORIDE,EPINEPHRINE BITARTRATE 4 ML: 15; .005 INJECTION, SOLUTION EPIDURAL; INFILTRATION; INTRACAUDAL; PERINEURAL at 08:57

## 2021-01-19 RX ADMIN — Medication 4 ML: at 09:05

## 2021-01-19 RX ADMIN — DOCUSATE SODIUM 100 MG: 100 CAPSULE ORAL at 18:29

## 2021-01-19 NOTE — ANESTHESIA PROCEDURE NOTES
Epidural Block    Patient location during procedure: OB  Start time: 1/19/2021 8:57 AM  End time: 1/19/2021 9:06 AM  Reason for block: labor epidural  Staffing  Performed: attending   Anesthesiologist: Yael Callaway MD  Resident/CRNA: Suzan Martin CRNA  Preanesthetic Checklist  Completed: patient identified, risks and benefits discussed, surgical consent, pre-op evaluation and timeout performed  Block Placement  Patient position: sitting  Prep: ChloraPrep  Sterility prep: cap, drape, gloves, hand and mask  Sedation level: no sedation  Patient monitoring: continuous pulse oximetry and heart rate  Approach: midline  Location: lumbar  Lumbar location: L3-L4  Epidural  Guidance: landmark technique  Needle  Needle type: Tuohy   Needle gauge: 17 G  Needle length: 10 cm  Needle insertion depth: 5.5 cm  Catheter type: end hole  Catheter size: 19 G  Catheter at skin depth: 10.5 cm  Catheter securement method: clear occlusive dressing, liquid medical adhesive and surgical tape  Test dose: negative  Medications Administered  Lidocaine-EPINEPHrine (XYLOCAINE) 1.5 %-1:200,000 Epidural, 4 mL  Assessment  Block outcome: pain improved  Number of attempts: 1  Procedure assessment: patient tolerated procedure well with no complications

## 2021-01-19 NOTE — PROGRESS NOTES
EPIDURAL PLACEMENT      Dr Hernández at bedside at 0854.  YESSI Villegas at bedside at 0854    Assisted pt to sitting up on bedside at 0853.    Timeout completed at 0856 with YESSI MILLS and myself at bedside.    Test dose given at 0903.  Negative reaction.    Dose given at 0907.    Pt assisted to lying back in left tilt position.    See anesthesia record for details.  See vital sign flow sheet for BP.    Tolerated procedure well.

## 2021-01-19 NOTE — PROGRESS NOTES
Labor Progress Note:     S: Ms. Yris Marie is admitted with pregnancy at 39w2d for induction of labor. Prenatal course was normal.     Patient is not yet feeling contractions. Patient reports no concerns. O:   Patient Vitals for the past 12 hrs:   Temp Pulse Resp BP   21 0639 98.3 °F (36.8 °C) (!) 110 16 (!) 134/90      Physical Exam:  Patient without distress. Respirations even, nonlabored  Abdomen/Fundus: Gravid, relaxation between contraction, soft and non tender  Lower Extremities:  - Edema No  Cervical Exam: 3.5\75/-2  Membranes:  Artificial Rupture of Membranes; Amniotic Fluid: medium amount of clear fluid    Fetal Heart Rate: Baseline: 125 per minute  Variability: moderate  Accelerations: yes  Decelerations: none  Uterine contractions: irregular, every 4-5 minutes          A/P:    Continue induction labor with pitocin. AROM performed at 7:50a. GBS positive on PCN. FHT Cat 1. Anticipate .     Coretta Jordan MD

## 2021-01-19 NOTE — PROGRESS NOTES
Admission assessment complete as noted. Patient oriented to room and unit. Plan of care reviewed and patient verbalizes understanding. Questions encouraged and answered. Patent encouraged to call for needs or concerns. Safety Teaching reviewed:   1. Hand hygiene prior to handling the infant. 2. Use of bulb syringe. 3. Bracelets with matching numbers are placed on mother and infant  4. An infant security tag  [facility]) is placed on the infant's ankle and monitored  5. All OB nurses wear pink Employee badges - do not give your baby to anyone without proper identification. 6. Never leave the baby alone in the room. 7. The infant should be placed on their back to sleep. on a firm mattress. No toys should be placed in the crib. (safe sleep video offered to view)  8. Never shake the baby (video offered to view)  9. Infant fall prevention - do not sleep with the baby, and place the baby in the crib while ambulating. 8. Mother and Baby Care booklet given to Mother.

## 2021-01-19 NOTE — PROGRESS NOTES
Pt admitted to room 424. EFM on and tracing. IV started, labs collected and sent. Consents signed. POC reviewed. Bed low and locked, call light within reach.  Leona Fallow at bedside. No needs/concerns at this time.

## 2021-01-19 NOTE — L&D DELIVERY NOTE
Delivery Note    Obstetrician:  Florencia Monterroso MD    Pre-Delivery Diagnosis: Term pregnancy (39w2d) and Induced labor    Post-Delivery Diagnosis: Same, plus live born male Nori Sharma)    Procedure: Spontaneous vaginal delivery    Epidural: YES    Quantitative Blood Loss:  110 cc    Episiotomy: none    Laceration(s):  Left labial/periclitoral abrasion    Laceration(s) repair: NO    Complications:  none    Delivery note:  Patient progressed to complete/complete/+1 and pushed to deliver a male infant, position ALEXI. Head was delivered in a controlled manner followed by shoulders and body with usual maneuvers. The infant was placed on mother's chest for skin to skin contact. Cord was clamped and cut after a 30 second delay. The placenta delivered spontaneously and intact, described as above. Uterus was firm, lochia appropriate. Small left labial/ periclitoral abrasion noted, hemostatic, so not repaired. Mom and baby doing well. Cord Blood Results:   Information for the patient's :  Jody Quan [[id number]]   No results found for: PCTABR, PCTDIG, BILI, ABORH     Prenatal Labs:     Lab Results   Component Value Date/Time    ABO/Rh(D) O POSITIVE 2021 06:46 AM    HBsAg, External Negative 2018    HIV, External Non-Reactive 2018    Rubella, External Immune 2018    RPR, External Non-Reactive 2018    GrBStrep, External positive urine 12/15/2020        Florencia Monterroso MD               Delivery Summary    Patient: Kely Espinoza MRN: [id number]  SSN: [id number]    YOB: 1995  Age: 22 y.o.   Sex: female       Information for the patient's :  Jody Quan [[id number]]       Labor Events:    Labor: No    Steroids: None   Cervical Ripening Date/Time:       Cervical Ripening Type: None   Antibiotics During Labor:     Rupture Identifier:      Rupture Date/Time: 2021 7:55 AM   Rupture Type: AROM   Amniotic Fluid Volume:      Amniotic Fluid Description: Clear    Amniotic Fluid Odor: None    Induction: Oxytocin       Induction Date/Time:        Indications for Induction:      Augmentation:     Augmentation Date/Time:      Indications for Augmentation:     Labor complications: Additional complications:        Delivery Events:  Indications For Episiotomy:     Episiotomy: None   Perineal Laceration(s): None   Repaired:     Periurethral Laceration Location:      Repaired:     Labial Laceration Location: left   Repaired: No   Sulcal Laceration Location:     Repaired:     Vaginal Laceration Location:     Repaired:     Cervical Laceration Location:     Repaired:     Repair Suture: None   Number of Repair Packets:     Estimated Blood Loss (ml):  ml   Quantitative Blood Loss (ml)                Delivery Date: 2021    Delivery Time: 10:49 AM  Delivery Type: Vaginal, Spontaneous  Sex:  Male    Gestational Age: 44w2d   Delivery Clinician:  Dayana Sanders  Living Status: Living   Delivery Location: L&D 424          APGARS  One minute Five minutes Ten minutes   Skin color: 1   1        Heart rate: 2   2        Grimace: 2   2        Muscle tone: 2   2        Breathin   2        Totals: 9   9            Presentation: Vertex    Position: Left Occiput Anterior  Resuscitation Method:  Suctioning-bulb; Tactile Stimulation     Meconium Stained: None      Cord Information: 3 Vessels  Complications: None  Cord around:    Delayed cord clamping? Yes  Cord clamped date/time:2021 10:51 AM  Disposition of Cord Blood: Lab    Blood Gases Sent?: No    Placenta:  Date/Time: 2021 10:54 AM  Removal: Spontaneous      Appearance: Normal     [place] Measurements:  Birth Weight: 3.3 kg      Birth Length: 51 cm      Head Circumference: 36.5 cm      Chest Circumference: 34 cm     Abdominal Girth:       Other Providers:   SANJUANA CACERES;LONG JANE;JAMESON CANALES MELISSA A, Obstetrician;Primary Nurse;Primary [place] Nurse;Scrub Tech           Group B Strep:   Lab Results   Component Value Date/Time    GrBStrep, External positive urine 12/15/2020     Information for the patient's :  Octavia Torres [[id number]]   No results found for: ABORH, PCTABR, PCTDIG, BILI, ABORHEXT, ABORH     No results for input(s): PCO2CB, PO2CB, HCO3I, SO2I, IBD, PTEMPI, SPECTI, PHICB, ISITE, IDEV, IALLEN in the last 72 hours.

## 2021-01-19 NOTE — PROGRESS NOTES
SBAR OUT Report: Mother    Verbal report given to EB Hunt RN (full name & credentials) on this patient, who is now being transferred to MIU (unit) for routine progression of care. The patient is not wearing a green \"Anesthesia-Duramorph\" band. Report consisted of patient's Situation, Background, Assessment and Recommendations (SBAR). [place] ID bands were compared with the identification form, and verified with the patient and receiving nurse. Information from the SBAR and the [facility] Report was reviewed with the receiving nurse; opportunity for questions and clarification provided.

## 2021-01-19 NOTE — PROGRESS NOTES
Dr Lela Holloway at bedside. Pt starting to push. 1042- Pt set up for delivery. 1049-  viable male. Apgars 9/9. Wt 7-4.     1054- Placenta delivered. Pitocin infusing at 500 cc/hr per MD order.

## 2021-01-19 NOTE — H&P
OB History & Physical    Name: Moises Walden MRN: [id number]  SSN: [id number]    YOB: 1995  Age: 22 y.o. Sex: female      Subjective:     Estimated Date of Delivery: 21  OB History    Para Term  AB Living   2 1 1     1   SAB TAB Ectopic Molar Multiple Live Births           0 1      # Outcome Date GA Lbr Sundeep/2nd Weight Sex Delivery Anes PTL Lv   2 Current            1 Term 19 39w0d  3.29 kg F Servando Phillips Person is admitted with pregnancy at 39w2d for induction of labor due to term pregnancy with favorable cervix. Prenatal course was normal.  Please see prenatal records for details. Today, patient reports active fetal movement, and no vaginal bleeding, leakage of fluid, or uterine contractions. Past Medical History:   Diagnosis Date    Primary oligomenorrhea 2013    PUPP (pruritic urticarial papules and plaques of pregnancy)      Past Surgical History:   Procedure Laterality Date    HX OTHER SURGICAL      frenulum release    HX OTHER SURGICAL      arthroscopic knee     Social History     Occupational History    Not on file   Tobacco Use    Smoking status: Never Smoker    Smokeless tobacco: Never Used   Substance and Sexual Activity    Alcohol use: No     Frequency: Never    Drug use: No    Sexual activity: Yes     Partners: Male     No family history on file. No Known Allergies  Prior to Admission medications    Medication Sig Start Date End Date Taking? Authorizing Provider   prenatal vit-iron fumarate-fa 27 mg iron- 0.8 mg tab tablet Take 1 Tab by mouth daily. Provider, Historical        Review of Systems: A comprehensive review of systems was negative except for that written in the History of Present Illness.     Objective:     Vitals:  Vitals:    21 0639 21 0649   BP: (!) 134/90    Pulse: (!) 110    Resp: 16    Temp: 98.3 °F (36.8 °C)    Weight:  77.1 kg (170 lb)   Height:  5' (1.524 m) Physical Exam:  Patient without distress. Heart: Regular rate and rhythm  Lung: clear to auscultation throughout lung fields, no wheezes, no rales, no rhonchi and normal respiratory effort  Back: costovertebral angle tenderness absent  Abdomen: soft, nontender  Fundus: soft and non tender  Perineum: blood absent, amniotic fluid absent  Cervical Exam: 2\75/-3  Lower Extremities:  - Edema No  Membranes:  Intact    Fetal Heart Rate: Reactive          Prenatal Labs:   Lab Results   Component Value Date/Time    ABO/Rh(D) O POSITIVE 02/11/2019 07:23 AM    Rubella, External Immune 07/09/2018    HBsAg, External Negative 07/09/2018    HIV, External Non-Reactive 07/09/2018    RPR, External Non-Reactive 07/09/2018    GrBStrep, External positive urine 12/15/2020       Impression/Plan:     Principal Problem:    Encounter for planned induction of labor (2/11/2019)    Active Problems:    39 weeks gestation of pregnancy (2/11/2019)      Group B Streptococcus carrier state affecting pregnancy (1/19/2021)         Plan: Admit for induction of labor. Plan for pitocin and AROM when able. Group B Strep positive, will treat prophylactically with penicillin.     Keiko Springer MD

## 2021-01-19 NOTE — ANESTHESIA PREPROCEDURE EVALUATION
Anesthetic History   No history of anesthetic complications            Review of Systems / Medical History  Patient summary reviewed and pertinent labs reviewed    Pulmonary  Within defined limits                 Neuro/Psych   Within defined limits           Cardiovascular  Within defined limits                Exercise tolerance: >4 METS     GI/Hepatic/Renal  Within defined limits              Endo/Other  Within defined limits           Other Findings              Physical Exam    Airway  Mallampati: II  TM Distance: > 6 cm  Neck ROM: normal range of motion   Mouth opening: Normal     Cardiovascular  Regular rate and rhythm,  S1 and S2 normal,  no murmur, click, rub, or gallop             Dental  No notable dental hx       Pulmonary  Breath sounds clear to auscultation               Abdominal  GI exam deferred       Other Findings            Anesthetic Plan    ASA: 2  Anesthesia type: epidural            Anesthetic plan and risks discussed with: Patient and Spouse      Term IUP requesting labor epidural.

## 2021-01-19 NOTE — PROGRESS NOTES
SBAR IN Report: Mother    Verbal report received from Magali Lucio RN on this patient, who is now being transferred from L&D (unit) for routine progression of care. The patient is not wearing a green \"Anesthesia-Duramorph\" band. Report consisted of patient's Situation, Background, Assessment and Recommendations (SBAR). [place] ID bands were compared with the identification form, and verified with the patient and transferring nurse. Information from the SBAR and Procedure Summary and the [place] Report was reviewed with the transferring nurse; opportunity for questions and clarification provided.

## 2021-01-20 VITALS
HEART RATE: 69 BPM | OXYGEN SATURATION: 97 % | DIASTOLIC BLOOD PRESSURE: 77 MMHG | SYSTOLIC BLOOD PRESSURE: 115 MMHG | HEIGHT: 60 IN | BODY MASS INDEX: 33.38 KG/M2 | WEIGHT: 170 LBS | TEMPERATURE: 98.2 F | RESPIRATION RATE: 20 BRPM

## 2021-01-20 LAB
HCT VFR BLD AUTO: 31.7 % (ref 35.8–46.3)
HGB BLD-MCNC: 9.7 G/DL (ref 11.7–15.4)

## 2021-01-20 PROCEDURE — 74011250637 HC RX REV CODE- 250/637: Performed by: OBSTETRICS & GYNECOLOGY

## 2021-01-20 PROCEDURE — 36415 COLL VENOUS BLD VENIPUNCTURE: CPT

## 2021-01-20 PROCEDURE — 85018 HEMOGLOBIN: CPT

## 2021-01-20 RX ADMIN — IBUPROFEN 600 MG: 600 TABLET, FILM COATED ORAL at 09:00

## 2021-01-20 RX ADMIN — IBUPROFEN 600 MG: 600 TABLET, FILM COATED ORAL at 02:54

## 2021-01-20 RX ADMIN — DOCUSATE SODIUM 100 MG: 100 CAPSULE ORAL at 09:00

## 2021-01-20 RX ADMIN — PRENATAL VIT W/ FE FUMARATE-FA TAB 27-0.8 MG 1 TABLET: 27-0.8 TAB at 09:00

## 2021-01-20 NOTE — DISCHARGE INSTRUCTIONS
Patient Education        Vaginal Childbirth: Care Instructions  Overview     Vaginal birth means delivering a baby through the birth canal (vagina). During labor, the uterus tightens (contracts) regularly to thin and open the cervix and to push the baby out through the birth canal.  Your body will slowly heal in the next few weeks. It's easy to get too tired and overwhelmed during the first weeks after your baby is born. Changes in your hormones can shift your mood without warning. You may find it hard to meet the extra demands on your energy and time. Take it easy on yourself. Follow-up care is a key part of your treatment and safety. Be sure to make and go to all appointments, and call your doctor if you are having problems. It's also a good idea to know your test results and keep a list of the medicines you take. How can you care for yourself at home? Vaginal bleeding and cramps  · After delivery, you will have a bloody discharge from your vagina. This will turn pink within a week and then white or yellow after about 10 days. It may last for 2 to 4 weeks or longer, until the uterus has healed. Use pads instead of tampons until you stop bleeding. · Don't worry if you pass some blood clots, as long as they are smaller than a golf ball. If you have a tear or stitches in your vaginal area, change the pad at least every 4 hours. This will help prevent soreness and infection. · You may have cramps for the first few days after childbirth. These are normal and occur as the uterus shrinks to normal size. Take an over-the-counter pain medicine, such as acetaminophen (Tylenol), ibuprofen (Advil, Motrin), or naproxen (Aleve), for cramps. Read and follow all instructions on the label. Do not take aspirin, because it can cause more bleeding. · Do not take two or more pain medicines at the same time unless the doctor told you to. Many pain medicines have acetaminophen, which is Tylenol.  Too much acetaminophen (Tylenol) can be harmful. Stitches  · If you have stitches, they will dissolve on their own and don't need to be removed. Follow your doctor's instructions for cleaning the stitched area. · Put ice or a cold pack on your painful area for 10 to 20 minutes at a time, several times a day, for the first few days. Put a thin cloth between the ice and your skin. · Sit in a few inches of warm water (sitz bath) 3 times a day and after bowel movements. The warm water helps with pain and itching. If you don't have a tub, a warm shower might help. Breast fullness  · Your breasts may overfill (engorge) in the first few days after delivery. To help milk flow and to relieve pain, warm your breasts in the shower or by using warm, moist towels before nursing. · If you aren't nursing, don't put warmth on your breasts or touch your breasts. Wear a tight bra or sports bra and use ice until the fullness goes away. This usually takes 2 to 3 days. · Put ice or a cold pack on your breast after nursing to reduce swelling and pain. Put a thin cloth between the ice and your skin. Activity  · Eat a balanced diet. Don't try to lose weight by cutting calories. Keep taking your prenatal vitamins, or take a multivitamin. · Get as much rest as you can. Try to take naps when your baby sleeps during the day. · Get some exercise every day. But don't do any heavy exercise until your doctor says it is okay. · Wait until you are healed (about 4 to 6 weeks) before you have sexual intercourse. Your doctor will tell you when it is okay to have sex. · Talk to your doctor about birth control. You can get pregnant even before your period returns. Also, you can get pregnant while you are breastfeeding. Mental health  · It's normal to have some sadness, anxiety, sleeplessness, and mood swings after you go home. If you feel upset or hopeless for more than a few days or are having trouble doing the things you need to do, talk to your doctor.   Constipation and hemorrhoids  · Drink plenty of fluids, enough so that your urine is light yellow or clear like water. If you have kidney, heart, or liver disease and have to limit fluids, talk with your doctor before you increase the amount of fluids you drink. · Eat plenty of fiber each day. Have a bran muffin or bran cereal for breakfast. Try eating a piece of fruit for a mid-afternoon snack. · For painful, itchy hemorrhoids, put ice or a cold pack on the area several times a day for 10 minutes at a time. Follow this by putting a warm compress on the area for another 10 to 20 minutes or by sitting in a shallow, warm bath. When should you call for help? Call  911 anytime you think you may need emergency care. For example, call if:    · You have thoughts of harming yourself, your baby, or another person.     · You passed out (lost consciousness).     · You have chest pain, are short of breath, or cough up blood.     · You have a seizure. Call your doctor now or seek immediate medical care if:    · You have severe vaginal bleeding.     · You are dizzy or lightheaded, or you feel like you may faint.     · You have a fever.     · You have new or more pain in your belly or pelvis.     · You have symptoms of a blood clot in your leg (called a deep vein thrombosis), such as:  ? Pain in the calf, back of the knee, thigh, or groin. ? Redness and swelling in your leg or groin.     · You have signs of preeclampsia, such as:  ? Sudden swelling of your face, hands, or feet. ? New vision problems (such as dimness, blurring, or seeing spots). ? A severe headache. Watch closely for changes in your health, and be sure to contact your doctor if:    · Your vaginal bleeding seems to be getting heavier.     · You have new or worse vaginal discharge.     · You feel sad, anxious, or hopeless for more than a few days.     · You do not get better as expected. Where can you learn more?   Go to http://www.gray.com/  Enter D819 in the search box to learn more about \"Vaginal Childbirth: Care Instructions. \"  Current as of: February 11, 2020               Content Version: 12.6  © 2852-4861 [employer]. Care instructions adapted under license by [employer] (which disclaims liability or warranty for this information). If you have questions about a medical condition or this instruction, always ask your healthcare professional. [place] [address] any warranty or liability for your use of this information.

## 2021-01-20 NOTE — DISCHARGE SUMMARY
Obstetrical Discharge Summary     Name: Jos Maynard MRN: [id number]  SSN: [id number]    YOB: 1995  Age: 22 y.o. Sex: female      Allergies: Patient has no known allergies. Admit Date: 2021    Discharge Date: 2021     Admitting Physician: Renee Crockett MD     Attending Physician:  Kelly Craig MD     * Admission Diagnoses: 39 weeks gestation of pregnancy [Z3A.39]  Encounter for planned induction of labor [Z34.90]    * Discharge Diagnoses:   Information for the patient's :  Bacilio Alford [[id number]]   Delivery of a 3.3 kg male infant via Vaginal, Spontaneous on 2021 at 10:49 AM  by Renee Crockett. Apgars were 9  and 9 . Additional Diagnoses:   Hospital Problems as of 2021 Date Reviewed: 2021          Codes Class Noted - Resolved POA    Group B Streptococcus carrier state affecting pregnancy ICD-10-CM: O99.820  ICD-9-CM: 648.90, V02.51  2021 - Present Yes        39 weeks gestation of pregnancy ICD-10-CM: Z3A.39  ICD-9-CM: V22.2  2019 - Present Yes        * (Principal) Encounter for planned induction of labor ICD-10-CM: Z34.90  ICD-9-CM: V22.1  2019 - Present Yes             Lab Results   Component Value Date/Time    ABO/Rh(D) O POSITIVE 2021 06:46 AM    Rubella, External Immune 2018    GrBStrep, External positive urine 12/15/2020      Immunization History   Administered Date(s) Administered    Tdap 2008       * Procedures:   * No surgery found *           * Discharge Condition: good and stable    * Hospital Course: Normal hospital course following the delivery. * Disposition: Home    Discharge Medications:   Current Discharge Medication List          * Follow-up Care/Patient Instructions:   Activity: Activity as tolerated  Diet: Regular Diet  Wound Care: As directed    Follow-up Information     Follow up With Specialties Details Why Contact Info    Unknown, Provider    Patient not available to ask Post-Partum Day Number 1 Progress/Discharge Note    Patient doing well post-partum without significant complaint. Voiding without difficulty, normal lochia, positive flatus. Vitals:    Patient Vitals for the past 8 hrs:   BP Temp Pulse Resp SpO2   21 0753 115/77 98.2 °F (36.8 °C) 69 20 97 %     Temp (24hrs), Av °F (36.7 °C), Min:97.7 °F (36.5 °C), Max:98.2 °F (36.8 °C)      Vital signs stable, afebrile. Exam:  Patient without distress. Abdomen soft, fundus firm at level of umbilicus, non tender               Perineum with normal lochia noted. Lower extremities are negative for swelling, cords or tenderness. Lab/Data Review: All lab results for the last 24 hours reviewed. Assessment and Plan:  Patient appears to be having uncomplicated post-partum course. Continue routine perineal care and maternal education. Plan discharge for today with follow up in our office in 6 weeks.

## 2021-01-20 NOTE — PROGRESS NOTES
Chart reviewed - no needs identified. SW met with patient while social distancing and utilizing appropriate PPE. Patient denies any history of postpartum depression/anxiety. Patient given informational packet on  mood & anxiety disorders (resources/education). Patient does not have a PCP and declined the need for arranging this. Family denies any additional needs from  at this time. Family has 's contact information should any needs/questions arise.     NATHANIEL Agarwal-JOCELYNE  [facility]   [phone]

## 2022-03-18 PROBLEM — R19.7 DIARRHEA: Status: ACTIVE | Noted: 2020-08-26

## 2022-03-18 PROBLEM — K62.5 RECTAL BLEEDING: Status: ACTIVE | Noted: 2020-08-26

## 2022-03-19 PROBLEM — Z34.90 ENCOUNTER FOR PLANNED INDUCTION OF LABOR: Status: ACTIVE | Noted: 2019-02-11

## 2022-03-19 PROBLEM — Z3A.19 19 WEEKS GESTATION OF PREGNANCY: Status: ACTIVE | Noted: 2020-08-26

## 2022-03-19 PROBLEM — R10.2 PELVIC PAIN IN ANTEPARTUM PERIOD IN SECOND TRIMESTER: Status: ACTIVE | Noted: 2020-08-26

## 2022-03-19 PROBLEM — O26.899 ANTEPARTUM DEHYDRATION: Status: ACTIVE | Noted: 2020-08-26

## 2022-03-19 PROBLEM — O26.86 PUPP (PRURITIC URTICARIAL PAPULES AND PLAQUES OF PREGNANCY): Status: ACTIVE | Noted: 2019-02-11

## 2022-03-19 PROBLEM — O26.892 PELVIC PAIN IN ANTEPARTUM PERIOD IN SECOND TRIMESTER: Status: ACTIVE | Noted: 2020-08-26

## 2022-03-19 PROBLEM — E86.0 ANTEPARTUM DEHYDRATION: Status: ACTIVE | Noted: 2020-08-26

## 2022-03-19 PROBLEM — K52.9 GASTROENTERITIS: Status: ACTIVE | Noted: 2020-08-26

## 2022-03-19 PROBLEM — Z3A.39 39 WEEKS GESTATION OF PREGNANCY: Status: ACTIVE | Noted: 2019-02-11

## 2022-03-20 PROBLEM — O99.820 GROUP B STREPTOCOCCUS CARRIER STATE AFFECTING PREGNANCY: Status: ACTIVE | Noted: 2021-01-19

## 2022-07-28 NOTE — PROGRESS NOTES
[facility] CARDIOLOGY  [address] [facility], [address], [address] [place] [address]  PHONE: [phone]      22    NAME:  Jody Turcios  : 1995  MRN: [id number]         SUBJECTIVE:   Jody Turcios is a 32 y.o. female seen for a consultation visit regarding the following:     Chief Complaint   Patient presents with    Consultation    Irregular Heart Beat              HPI:  Consultation is requested by Alyssa Stafford MD for evaluation of Consultation and Irregular Heart Beat   . Consult palpitations. ER visit recently stood while feeding her baby, felt dizzy and left side of her body was numb, workup negative. She feels elevated heart rate as soon as she stands or sits up goes up, lasts for a few days, \"always over 100\" , drinks \"a few bottles \" of water daily, moderate caffeine, no herbal supplements, no structured exercise, has 1year old and 3year old, stay at home mom. Non smoker, rare alcohol. Symptoms have been present for years, had it in middle school as well. Past Medical History, Past Surgical History, Family history, Social History, and Medications were all reviewed with the patient today and updated as necessary. Prior to Admission medications    Medication Sig Start Date End Date Taking? Authorizing Provider   ibuprofen (ADVIL;MOTRIN) 100 MG tablet Take 100 mg by mouth every 6 hours as needed for Fever   Yes Historical Provider, MD     No Known Allergies  Past Medical History:   Diagnosis Date    Primary oligomenorrhea 2013    PUPP (pruritic urticarial papules and plaques of pregnancy)      Past Surgical History:   Procedure Laterality Date    OTHER SURGICAL HISTORY      frenulum release    OTHER SURGICAL HISTORY      arthroscopic knee     Family History   Problem Relation Age of Onset    Hypertension Father      Social History     Tobacco Use    Smoking status: Never    Smokeless tobacco: Never   Substance Use Topics    Alcohol use:  No ROS:    Review of Systems   Cardiovascular:  Positive for palpitations. Neurological:  Positive for dizziness. PHYSICAL EXAM:   /88   Pulse 93   Ht 5' (1.524 m)   Wt 166 lb (75.3 kg)   BMI 32.42 kg/m²      Physical Exam  Constitutional:       Appearance: Normal appearance. HENT:      Head: Normocephalic and atraumatic. Eyes:      Conjunctiva/sclera: Conjunctivae normal.   Neck:      Vascular: No carotid bruit. Cardiovascular:      Rate and Rhythm: Normal rate and regular rhythm. Heart sounds: No murmur heard. No friction rub. No gallop. Comments: No heart rate change from supine to sitting    Pulmonary:      Effort: No respiratory distress. Breath sounds: No wheezing or rales. Abdominal:      General: Abdomen is flat. There is no distension. Palpations: Abdomen is soft. Tenderness: There is no abdominal tenderness. Musculoskeletal:         General: No swelling. Cervical back: Neck supple. Skin:     General: Skin is warm and dry. Neurological:      General: No focal deficit present. Mental Status: She is alert. Psychiatric:         Mood and Affect: Mood normal.         Behavior: Behavior normal.       Medical problems and test results were reviewed with the patient today.      DATA REVIEW    BMP  Lab Results   Component Value Date/Time     08/29/2020 05:47 AM    K 3.3 08/29/2020 05:47 AM     08/29/2020 05:47 AM    CO2 28 08/29/2020 05:47 AM    BUN 2 08/29/2020 05:47 AM    CREATININE 0.36 08/29/2020 05:47 AM    GLUCOSE 83 08/29/2020 05:47 AM    CALCIUM 8.0 08/29/2020 05:47 AM      4/28/22 2017     WBC 3.5 - 10.8 K/uL 8.4    RBC 3.86 - 5.35 M/uL 4.93    Hemoglobin 11 - 15.4 g/dL 14.3    Hematocrit 35.6 - 47.3 % 41.4    MCV 79 - 100 fL 83.9    MCH 23.7 - 32.9 pg 28.9    MCHC 30 - 36.5 g/dL 34.5    RDW-CV 11.6 - 16 % 13.2    Platelets 379 - 948 K/uL 310      4/28/22 2017     Sodium 136 - 145 mmol/L 140    Potassium 3.5 - 5.1 mmol/L 4.0    Chloride 98 - 107 mmol/L 106    CO2 20 - 30 mmol/L 23    BUN 7 - 20 mg/dL 10    Calcium 8.5 - 10.4 mg/dL 9.3    Creatinine 0.57 - 1.11 mg/dL 0.70    Glucose 70 - 99 mg/dL 115 High     Anion Gap 6 - 16 mmol/L 11    eGFR >59 mL/min/1.73 m2 >90          EKG    Sinus  Rhythm 93  normal axis, intervals, ST and T waves    CXR/IMAGING        DEVICE INTERROGATION        OUTSIDE RECORDS REVIEW    Records from outside providers have been reviewed and summarized as noted in the HPI, past history and data review sections of this note, and reviewed with patient. .       ASSESSMENT and PLAN    Thom Chol was seen today for consultation and irregular heart beat. Diagnoses and all orders for this visit:    Palpitations  -     EKG 12 Lead  -     Extended cardiac holter monitor (48 hrs - 15 days); Future  -     TSH; Future        IMPRESSION:    3 day monitor and TSH to assess palpitations    Eliminate all caffeine, increase water intake and physical activity    Return for AFTER PROCEDURE TO REVIEW RESULTS. Thank you for allowing me to participate in this patient's care. Please call or contact me if there are any questions or concerns regarding the above.       Luz Swift MD  07/29/22  8:57 AM

## 2022-07-28 NOTE — PATIENT INSTRUCTIONS
Patient Education        Palpitations: Care Instructions  Overview     Heart palpitations are the uncomfortable sensation that your heart is beating fast or irregularly. You might feel pounding or fluttering in your chest. Itmight feel like your heart is skipping a beat. Palpitations may be caused by a heart problem. But they also occur because of many other things. These include other health problems, stress, exercise, or use of alcohol, caffeine, or nicotine. Some prescription medicines and over-the-counter medicines can also cause heart palpitations. Nearly everyonehas palpitations from time to time. Depending on your symptoms, your doctor may need to do more tests to try tofind the cause of your palpitations. Follow-up care is a key part of your treatment and safety. Be sure to make and go to all appointments, and call your doctor if you are having problems. It's also a good idea to know your test results and keep alist of the medicines you take. How can you care for yourself at home? If they trigger palpitations, limit or avoid alcohol or caffeine. Do not smoke. If you need help quitting, talk to your doctor about stop-smoking programs and medicines. These can increase your chances of quitting for good. Ask your doctor whether you can take over-the-counter medicines (such as decongestants). These may cause palpitations. If you think you may have a problem with drug use, talk to your doctor. Certain drugs, such as cocaine and methamphetamine, can affect your heart rate and rhythm. If you have palpitations again, take deep breaths and try to relax. Or try any physical things that your doctor recommended. These may include bearing down or coughing. If you start to feel lightheaded, sit or lie down to avoid injuries that might result if you pass out and fall down. If your doctor recommends it, keep a record of your palpitations and bring it to your next doctor's appointment. Write down:   The date and time.  Your pulse. (If your heart is beating fast, it may be hard to count your pulse.)  If your heart rhythm was regular or irregular. What you were doing when the palpitations started. How long the palpitations lasted. Any other symptoms. What may have helped your symptoms go away. If an activity causes palpitations, slow down or stop. Talk to your doctor before you do that activity again. Take your medicines exactly as prescribed. Call your doctor if you think you are having a problem with your medicine. When should you call for help? Call 911 anytime you think you may need emergency care. For example, call if:    You passed out (lost consciousness). You have symptoms of a heart attack. These may include:  Chest pain or pressure, or a strange feeling in the chest.  Sweating. Shortness of breath. Pain, pressure, or a strange feeling in the back, neck, jaw, or upper belly or in one or both shoulders or arms. Lightheadedness or sudden weakness. A fast or irregular heartbeat. After you call 911, the  may tell you to chew 1 adult-strength or 2 to 4 low-dose aspirin. Wait for an ambulance. Do not try to drive yourself. You have symptoms of a stroke. These may include:  Sudden numbness, tingling, weakness, or loss of movement in your face, arm, or leg, especially on only one side of your body. Sudden vision changes. Sudden trouble speaking. Sudden confusion or trouble understanding simple statements. Sudden problems with walking or balance. A sudden, severe headache that is different from past headaches. Call your doctor now or seek immediate medical care if:    You have heart palpitations and:  Are dizzy or lightheaded, or you feel like you may faint. Have new or increased shortness of breath. Watch closely for changes in your health, and be sure to contact your doctor if:    You continue to have heart palpitations. Where can you learn more?   Go to [email or web address].healthGo-[email or web address] [employer]. org and sign in to your [employer] account. Enter R508 in the [facility] box to learn more about \"Palpitations: Care Instructions. \"     If you do not have an account, please click on the \"Sign Up Now\" link. Current as of: January 10, 2022               Content Version: 13.3  © 4894-3518 [employer]wise, [facility]. Care instructions adapted under license by [facility] ([facility]). If you have questions about a medical condition or this instruction, always ask your healthcare professional. [address] any warranty or liability for your use of this information.

## 2022-07-29 ENCOUNTER — INITIAL CONSULT (OUTPATIENT)
Dept: CARDIOLOGY CLINIC | Age: 27
End: 2022-07-29
Payer: COMMERCIAL

## 2022-07-29 VITALS
SYSTOLIC BLOOD PRESSURE: 118 MMHG | WEIGHT: 166 LBS | BODY MASS INDEX: 32.59 KG/M2 | DIASTOLIC BLOOD PRESSURE: 88 MMHG | HEIGHT: 60 IN | HEART RATE: 93 BPM

## 2022-07-29 DIAGNOSIS — R00.2 PALPITATIONS: ICD-10-CM

## 2022-07-29 DIAGNOSIS — R00.2 PALPITATIONS: Primary | ICD-10-CM

## 2022-07-29 LAB — TSH, 3RD GENERATION: 1.6 UIU/ML (ref 0.36–3.74)

## 2022-07-29 PROCEDURE — 93000 ELECTROCARDIOGRAM COMPLETE: CPT | Performed by: INTERNAL MEDICINE

## 2022-07-29 PROCEDURE — 99204 OFFICE O/P NEW MOD 45 MIN: CPT | Performed by: INTERNAL MEDICINE

## 2022-08-11 PROBLEM — R00.2 PALPITATIONS: Status: ACTIVE | Noted: 2022-08-11

## 2022-08-30 ENCOUNTER — OFFICE VISIT (OUTPATIENT)
Dept: CARDIOLOGY CLINIC | Age: 27
End: 2022-08-30
Payer: COMMERCIAL

## 2022-08-30 VITALS
HEIGHT: 60 IN | SYSTOLIC BLOOD PRESSURE: 118 MMHG | WEIGHT: 165.6 LBS | HEART RATE: 80 BPM | BODY MASS INDEX: 32.51 KG/M2 | DIASTOLIC BLOOD PRESSURE: 78 MMHG

## 2022-08-30 DIAGNOSIS — R00.2 PALPITATIONS: Primary | ICD-10-CM

## 2022-08-30 PROCEDURE — 99212 OFFICE O/P EST SF 10 MIN: CPT | Performed by: INTERNAL MEDICINE

## 2022-08-30 NOTE — PROGRESS NOTES
[facility] CARDIOLOGY  [address] [address] [facility] [address], [address]  PHONE: [phone]      22    NAME:  Gerry Harris  : 1995  MRN: [id number]         SUBJECTIVE:   Gerry Harris is a 32 y.o. female seen for a follow up visit regarding the following:     Chief Complaint   Patient presents with    Palpitations    Results     Had Magdiel            HPI:  Follow up  Palpitations and Results (Had Krsyta Milligan)   . Follow up palpitations, monitor normal with avg rate 86, symptoms at normal rates and with low grade ST. We discussed in detail. She feels the same way, frequently feels her heart racing, dizzy, reviewed most of her symptoms were reported with normal heart rates. She is a young mom so there is that stress. She limits caffeine and has been better about water intake. PAST CARDIAC HISTORY:  Aug 2022        3 day monitor NSR, ST Rare ectopy Normal tracing Multiple diary entries, with symptoms noted during NSR 81-90 as well as with episodes of low grade sinus tachycardia. Higher sinus rates are not associated with symptoms. Most symptoms with rates ~ 100-110. Average HR 86            Key CAD CHF Meds       Patient is on no cardiovascular meds. Past Medical History, Past Surgical History, Family history, Social History, and Medications were all reviewed with the patient today and updated as necessary. Prior to Admission medications    Medication Sig Start Date End Date Taking?  Authorizing Provider   ibuprofen (ADVIL;MOTRIN) 100 MG tablet Take 100 mg by mouth every 6 hours as needed for Fever   Yes Historical Provider, MD     No Known Allergies  Past Medical History:   Diagnosis Date    Primary oligomenorrhea 2013    PUPP (pruritic urticarial papules and plaques of pregnancy)      Past Surgical History:   Procedure Laterality Date    OTHER SURGICAL HISTORY      frenulum release    OTHER SURGICAL HISTORY      arthroscopic knee     Family History   Problem Relation Age of Onset    Hypertension Father      Social History     Tobacco Use    Smoking status: Never    Smokeless tobacco: Never   Substance Use Topics    Alcohol use: No       ROS:    Review of Systems   Cardiovascular:  Positive for palpitations. Neurological:  Positive for light-headedness. PHYSICAL EXAM:   /78   Pulse 80   Ht 5' (1.524 m)   Wt 165 lb 9.6 oz (75.1 kg)   BMI 32.34 kg/m²      Wt Readings from Last 3 Encounters:   08/30/22 165 lb 9.6 oz (75.1 kg)   07/29/22 166 lb (75.3 kg)     BP Readings from Last 3 Encounters:   08/30/22 118/78   07/29/22 118/88     Pulse Readings from Last 3 Encounters:   08/30/22 80   07/29/22 93           Physical Exam  Vitals reviewed. Medical problems and test results were reviewed with the patient today. DATA REVIEW      BMP  Lab Results   Component Value Date/Time     08/29/2020 05:47 AM    K 3.3 08/29/2020 05:47 AM     08/29/2020 05:47 AM    CO2 28 08/29/2020 05:47 AM    BUN 2 08/29/2020 05:47 AM    CREATININE 0.36 08/29/2020 05:47 AM    GLUCOSE 83 08/29/2020 05:47 AM    CALCIUM 8.0 08/29/2020 05:47 AM          EKG        CXR/IMAGING        DEVICE INTERROGATION        OUTSIDE RECORDS REVIEW    Records from outside providers have been reviewed and summarized as noted in the HPI, past history and data review sections of this note, and reviewed with patient. .       ASSESSMENT and [facility] was seen today for palpitations and results. Diagnoses and all orders for this visit:    Palpitations        IMPRESSION:    Discussed her results. Offered BB for comfort but she feels if no significant arrhythmia she would like to avoid meds    Discussed stress can contribute to symptoms as can stimulants and dehydration. She will be mindful of these things, and encouraged to try to carve out 30 minutes a day to get some regular exercise which may help over time as well.       She may contact me if she changes her mind about BB therapy or return for other concerns. Return if symptoms worsen or fail to improve. Thank you for allowing me to participate in this patient's care. Please call or contact me if there are any questions or concerns regarding the above.       Jody Wilkins MD  08/30/22  9:43 AM

## 2022-08-30 NOTE — PATIENT INSTRUCTIONS
Patient Education        Palpitations: Care Instructions  Overview     Heart palpitations are the uncomfortable sensation that your heart is beating fast or irregularly. You might feel pounding or fluttering in your chest. Itmight feel like your heart is skipping a beat. Palpitations may be caused by a heart problem. But they also occur because of many other things. These include other health problems, stress, exercise, or use of alcohol, caffeine, or nicotine. Some prescription medicines and over-the-counter medicines can also cause heart palpitations. Nearly everyonehas palpitations from time to time. Depending on your symptoms, your doctor may need to do more tests to try tofind the cause of your palpitations. Follow-up care is a key part of your treatment and safety. Be sure to make and go to all appointments, and call your doctor if you are having problems. It's also a good idea to know your test results and keep alist of the medicines you take. How can you care for yourself at home? If they trigger palpitations, limit or avoid alcohol or caffeine. Do not smoke. If you need help quitting, talk to your doctor about stop-smoking programs and medicines. These can increase your chances of quitting for good. Ask your doctor whether you can take over-the-counter medicines (such as decongestants). These may cause palpitations. If you think you may have a problem with drug use, talk to your doctor. Certain drugs, such as cocaine and methamphetamine, can affect your heart rate and rhythm. If you have palpitations again, take deep breaths and try to relax. Or try any physical things that your doctor recommended. These may include bearing down or coughing. If you start to feel lightheaded, sit or lie down to avoid injuries that might result if you pass out and fall down. If your doctor recommends it, keep a record of your palpitations and bring it to your next doctor's appointment. Write down:   The date and time.  Your pulse. (If your heart is beating fast, it may be hard to count your pulse.)  If your heart rhythm was regular or irregular. What you were doing when the palpitations started. How long the palpitations lasted. Any other symptoms. What may have helped your symptoms go away. If an activity causes palpitations, slow down or stop. Talk to your doctor before you do that activity again. Take your medicines exactly as prescribed. Call your doctor if you think you are having a problem with your medicine. When should you call for help? Call 911 anytime you think you may need emergency care. For example, call if:    You passed out (lost consciousness). You have symptoms of a heart attack. These may include:  Chest pain or pressure, or a strange feeling in the chest.  Sweating. Shortness of breath. Pain, pressure, or a strange feeling in the back, neck, jaw, or upper belly or in one or both shoulders or arms. Lightheadedness or sudden weakness. A fast or irregular heartbeat. After you call 911, the  may tell you to chew 1 adult-strength or 2 to 4 low-dose aspirin. Wait for an ambulance. Do not try to drive yourself. You have symptoms of a stroke. These may include:  Sudden numbness, tingling, weakness, or loss of movement in your face, arm, or leg, especially on only one side of your body. Sudden vision changes. Sudden trouble speaking. Sudden confusion or trouble understanding simple statements. Sudden problems with walking or balance. A sudden, severe headache that is different from past headaches. Call your doctor now or seek immediate medical care if:    You have heart palpitations and:  Are dizzy or lightheaded, or you feel like you may faint. Have new or increased shortness of breath. Watch closely for changes in your health, and be sure to contact your doctor if:    You continue to have heart palpitations. Where can you learn more?   Go to [email or web address] and sign in to your [employer] account. Enter R508 in the [facility] box to learn more about \"Palpitations: Care Instructions. \"     If you do not have an account, please click on the \"Sign Up Now\" link. Current as of: January 10, 2022               Content Version: 13.3  © 1751-9357 [employer]wise, [facility]. Care instructions adapted under license by [facility], [place] ([facility]). If you have questions about a medical condition or this instruction, always ask your healthcare professional. [place] [address] any warranty or liability for your use of this information.        .bfwb